# Patient Record
Sex: FEMALE | Race: WHITE | NOT HISPANIC OR LATINO | Employment: FULL TIME | URBAN - METROPOLITAN AREA
[De-identification: names, ages, dates, MRNs, and addresses within clinical notes are randomized per-mention and may not be internally consistent; named-entity substitution may affect disease eponyms.]

---

## 2023-02-10 PROBLEM — E88.810 ABDOMINAL OBESITY AND METABOLIC SYNDROME: Status: ACTIVE | Noted: 2023-02-10

## 2023-02-10 PROBLEM — G89.29 CHRONIC PAIN OF RIGHT KNEE: Status: ACTIVE | Noted: 2023-02-10

## 2023-02-10 PROBLEM — R63.2 POLYPHAGIA: Status: ACTIVE | Noted: 2023-02-10

## 2023-02-10 PROBLEM — F41.9 ANXIETY AND DEPRESSION: Status: ACTIVE | Noted: 2023-02-10

## 2023-02-10 PROBLEM — R63.5 WEIGHT GAIN FOLLOWING GASTRIC BYPASS SURGERY: Status: ACTIVE | Noted: 2023-02-10

## 2023-02-10 PROBLEM — J45.909 ASTHMA (HHS-HCC): Status: ACTIVE | Noted: 2023-02-10

## 2023-02-10 PROBLEM — E53.8 VITAMIN B 12 DEFICIENCY: Status: ACTIVE | Noted: 2023-02-10

## 2023-02-10 PROBLEM — R87.619 ABNORMAL PAP SMEAR OF CERVIX: Status: ACTIVE | Noted: 2023-02-10

## 2023-02-10 PROBLEM — E55.9 VITAMIN D DEFICIENCY: Status: ACTIVE | Noted: 2023-02-10

## 2023-02-10 PROBLEM — E58 CALCIUM DEFICIENCY: Status: ACTIVE | Noted: 2023-02-10

## 2023-02-10 PROBLEM — Z90.3 HISTORY OF SLEEVE GASTRECTOMY: Status: ACTIVE | Noted: 2023-02-10

## 2023-02-10 PROBLEM — H93.11 TINNITUS OF RIGHT EAR: Status: ACTIVE | Noted: 2023-02-10

## 2023-02-10 PROBLEM — K21.9 GERD (GASTROESOPHAGEAL REFLUX DISEASE): Status: ACTIVE | Noted: 2023-02-10

## 2023-02-10 PROBLEM — K58.9: Status: ACTIVE | Noted: 2023-02-10

## 2023-02-10 PROBLEM — E51.9 VITAMIN B1 DEFICIENCY: Status: ACTIVE | Noted: 2023-02-10

## 2023-02-10 PROBLEM — K91.2 POSTOPERATIVE MALABSORPTION (HHS-HCC): Status: ACTIVE | Noted: 2023-02-10

## 2023-02-10 PROBLEM — E50.9 VITAMIN A DEFICIENCY: Status: ACTIVE | Noted: 2023-02-10

## 2023-02-10 PROBLEM — M17.11 PRIMARY OSTEOARTHRITIS OF RIGHT KNEE: Status: ACTIVE | Noted: 2023-02-10

## 2023-02-10 PROBLEM — D64.9 ANEMIA: Status: ACTIVE | Noted: 2023-02-10

## 2023-02-10 PROBLEM — D63.1 ANEMIA DUE TO CHRONIC KIDNEY DISEASE: Status: ACTIVE | Noted: 2023-02-10

## 2023-02-10 PROBLEM — R87.610 PAP SMEAR ABNORMALITY OF CERVIX WITH ASCUS FAVORING BENIGN: Status: ACTIVE | Noted: 2023-02-10

## 2023-02-10 PROBLEM — E61.1 IRON DEFICIENCY: Status: ACTIVE | Noted: 2023-02-10

## 2023-02-10 PROBLEM — O99.619: Status: ACTIVE | Noted: 2023-02-10

## 2023-02-10 PROBLEM — Z98.84 WEIGHT GAIN FOLLOWING GASTRIC BYPASS SURGERY: Status: ACTIVE | Noted: 2023-02-10

## 2023-02-10 PROBLEM — E66.01 PSYCHOLOGICAL FACTORS AFFECTING MORBID OBESITY (MULTI): Status: ACTIVE | Noted: 2023-02-10

## 2023-02-10 PROBLEM — K44.9 HIATAL HERNIA: Status: ACTIVE | Noted: 2023-02-10

## 2023-02-10 PROBLEM — H90.41 SENSORINEURAL HEARING LOSS (SNHL) OF RIGHT EAR WITH UNRESTRICTED HEARING OF LEFT EAR: Status: ACTIVE | Noted: 2023-02-10

## 2023-02-10 PROBLEM — F54 PSYCHOLOGICAL FACTORS AFFECTING MORBID OBESITY (MULTI): Status: ACTIVE | Noted: 2023-02-10

## 2023-02-10 PROBLEM — G47.30 SLEEP APNEA: Status: ACTIVE | Noted: 2023-02-10

## 2023-02-10 PROBLEM — H91.90 HEARING LOSS: Status: ACTIVE | Noted: 2023-02-10

## 2023-02-10 PROBLEM — N18.9 ANEMIA DUE TO CHRONIC KIDNEY DISEASE: Status: ACTIVE | Noted: 2023-02-10

## 2023-02-10 PROBLEM — M25.561 CHRONIC PAIN OF RIGHT KNEE: Status: ACTIVE | Noted: 2023-02-10

## 2023-02-10 PROBLEM — F32.A ANXIETY AND DEPRESSION: Status: ACTIVE | Noted: 2023-02-10

## 2023-02-10 PROBLEM — Z98.84 BARIATRIC SURGERY STATUS: Status: ACTIVE | Noted: 2023-02-10

## 2023-02-10 PROBLEM — E66.9 ABDOMINAL OBESITY AND METABOLIC SYNDROME: Status: ACTIVE | Noted: 2023-02-10

## 2023-02-10 RX ORDER — TOPIRAMATE 25 MG/1
TABLET ORAL
COMMUNITY
Start: 2022-09-07 | End: 2023-09-26 | Stop reason: ALTCHOICE

## 2023-02-10 RX ORDER — ALBUTEROL SULFATE 0.83 MG/ML
SOLUTION RESPIRATORY (INHALATION)
COMMUNITY

## 2023-02-10 RX ORDER — OMEPRAZOLE 40 MG/1
1 CAPSULE, DELAYED RELEASE ORAL DAILY
COMMUNITY
Start: 2022-03-11 | End: 2023-09-26 | Stop reason: SDUPTHER

## 2023-02-10 RX ORDER — NABUMETONE 500 MG/1
1 TABLET, FILM COATED ORAL 2 TIMES DAILY
COMMUNITY
Start: 2022-04-13 | End: 2023-09-26 | Stop reason: ALTCHOICE

## 2023-02-10 RX ORDER — SERTRALINE HYDROCHLORIDE 100 MG/1
1 TABLET, FILM COATED ORAL DAILY
COMMUNITY
Start: 2020-04-20 | End: 2023-04-03 | Stop reason: SDUPTHER

## 2023-02-10 RX ORDER — ALBUTEROL SULFATE 90 UG/1
2 AEROSOL, METERED RESPIRATORY (INHALATION) EVERY 6 HOURS PRN
COMMUNITY
Start: 2020-12-14

## 2023-02-10 RX ORDER — ELECTROLYTES/DEXTROSE
1 SOLUTION, ORAL ORAL 2 TIMES DAILY
COMMUNITY
Start: 2021-03-23

## 2023-02-10 RX ORDER — METFORMIN HYDROCHLORIDE 500 MG/1
2 TABLET, EXTENDED RELEASE ORAL 2 TIMES DAILY
COMMUNITY
Start: 2022-06-15 | End: 2023-09-26 | Stop reason: ALTCHOICE

## 2023-02-10 RX ORDER — UBIDECARENONE 75 MG
1 CAPSULE ORAL DAILY
COMMUNITY
Start: 2019-02-11 | End: 2023-12-29

## 2023-03-06 ENCOUNTER — CLINICAL SUPPORT (OUTPATIENT)
Dept: PRIMARY CARE | Facility: CLINIC | Age: 38
End: 2023-03-06
Payer: COMMERCIAL

## 2023-03-06 DIAGNOSIS — E53.8 VITAMIN B 12 DEFICIENCY: Primary | ICD-10-CM

## 2023-03-06 PROCEDURE — 96372 THER/PROPH/DIAG INJ SC/IM: CPT | Performed by: FAMILY MEDICINE

## 2023-03-06 RX ORDER — CYANOCOBALAMIN 1000 UG/ML
1000 INJECTION, SOLUTION INTRAMUSCULAR; SUBCUTANEOUS ONCE
Status: COMPLETED | OUTPATIENT
Start: 2023-03-06 | End: 2023-03-06

## 2023-03-06 RX ADMIN — CYANOCOBALAMIN 1000 MCG: 1000 INJECTION, SOLUTION INTRAMUSCULAR; SUBCUTANEOUS at 13:09

## 2023-03-06 NOTE — PROGRESS NOTES
Patient is here today for a B12 injection which is a standing order. Administered in right arm. Patient tolerated well.

## 2023-04-03 ENCOUNTER — TELEPHONE (OUTPATIENT)
Dept: PRIMARY CARE | Facility: CLINIC | Age: 38
End: 2023-04-03
Payer: COMMERCIAL

## 2023-04-03 DIAGNOSIS — F41.9 ANXIETY AND DEPRESSION: ICD-10-CM

## 2023-04-03 DIAGNOSIS — F32.A ANXIETY AND DEPRESSION: ICD-10-CM

## 2023-04-03 RX ORDER — SERTRALINE HYDROCHLORIDE 100 MG/1
100 TABLET, FILM COATED ORAL DAILY
Qty: 30 TABLET | Refills: 0 | Status: SHIPPED | OUTPATIENT
Start: 2023-04-03 | End: 2023-07-12 | Stop reason: SDUPTHER

## 2023-07-06 LAB
ACTIVATED PARTIAL THROMBOPLASTIN TIME IN PPP BY COAGULATION ASSAY: 32 SEC (ref 27–38)
ALANINE AMINOTRANSFERASE (SGPT) (U/L) IN SER/PLAS: 11 U/L (ref 7–45)
ALBUMIN (G/DL) IN SER/PLAS: 4 G/DL (ref 3.4–5)
ALKALINE PHOSPHATASE (U/L) IN SER/PLAS: 66 U/L (ref 33–110)
AMPHETAMINE (PRESENCE) IN URINE BY SCREEN METHOD: NORMAL
ANION GAP IN SER/PLAS: 14 MMOL/L (ref 10–20)
ASPARTATE AMINOTRANSFERASE (SGOT) (U/L) IN SER/PLAS: 13 U/L (ref 9–39)
BARBITURATES PRESENCE IN URINE BY SCREEN METHOD: NORMAL
BASOPHILS (10*3/UL) IN BLOOD BY AUTOMATED COUNT: 0.02 X10E9/L (ref 0–0.1)
BASOPHILS/100 LEUKOCYTES IN BLOOD BY AUTOMATED COUNT: 0.3 % (ref 0–2)
BENZODIAZEPINE (PRESENCE) IN URINE BY SCREEN METHOD: NORMAL
BILIRUBIN TOTAL (MG/DL) IN SER/PLAS: 0.4 MG/DL (ref 0–1.2)
C PEPTIDE (NG/ML) IN SER/PLAS: 5.1 NG/ML (ref 0.7–3.9)
CALCIDIOL (25 OH VITAMIN D3) (NG/ML) IN SER/PLAS: 17 NG/ML
CALCIUM (MG/DL) IN SER/PLAS: 8.6 MG/DL (ref 8.6–10.3)
CANNABINOIDS IN URINE BY SCREEN METHOD: NORMAL
CARBON DIOXIDE, TOTAL (MMOL/L) IN SER/PLAS: 23 MMOL/L (ref 21–32)
CHLORIDE (MMOL/L) IN SER/PLAS: 106 MMOL/L (ref 98–107)
CHOLESTEROL (MG/DL) IN SER/PLAS: 165 MG/DL (ref 0–199)
CHOLESTEROL IN HDL (MG/DL) IN SER/PLAS: 35.5 MG/DL
CHOLESTEROL/HDL RATIO: 4.6
COBALAMIN (VITAMIN B12) (PG/ML) IN SER/PLAS: 355 PG/ML (ref 211–911)
COCAINE (PRESENCE) IN URINE BY SCREEN METHOD: NORMAL
CREATININE (MG/DL) IN SER/PLAS: 0.66 MG/DL (ref 0.5–1.05)
DRUG SCREEN COMMENT URINE: NORMAL
EOSINOPHILS (10*3/UL) IN BLOOD BY AUTOMATED COUNT: 0.05 X10E9/L (ref 0–0.7)
EOSINOPHILS/100 LEUKOCYTES IN BLOOD BY AUTOMATED COUNT: 0.8 % (ref 0–6)
ERYTHROCYTE DISTRIBUTION WIDTH (RATIO) BY AUTOMATED COUNT: 14.1 % (ref 11.5–14.5)
ERYTHROCYTE MEAN CORPUSCULAR HEMOGLOBIN CONCENTRATION (G/DL) BY AUTOMATED: 31 G/DL (ref 32–36)
ERYTHROCYTE MEAN CORPUSCULAR VOLUME (FL) BY AUTOMATED COUNT: 85 FL (ref 80–100)
ERYTHROCYTES (10*6/UL) IN BLOOD BY AUTOMATED COUNT: 4.17 X10E12/L (ref 4–5.2)
ESTIMATED AVERAGE GLUCOSE FOR HBA1C: 108 MG/DL
FENTANYL URINE: NORMAL
FERRITIN (UG/LL) IN SER/PLAS: 19 UG/L (ref 8–150)
FOLATE (NG/ML) IN SER/PLAS: 7 NG/ML
GFR FEMALE: >90 ML/MIN/1.73M2
GLUCOSE (MG/DL) IN SER/PLAS: 81 MG/DL (ref 74–99)
H. PYLORI UBIT: NEGATIVE
HEMATOCRIT (%) IN BLOOD BY AUTOMATED COUNT: 35.5 % (ref 36–46)
HEMOGLOBIN (G/DL) IN BLOOD: 11 G/DL (ref 12–16)
HEMOGLOBIN A1C/HEMOGLOBIN TOTAL IN BLOOD: 5.4 %
IMMATURE GRANULOCYTES/100 LEUKOCYTES IN BLOOD BY AUTOMATED COUNT: 0.2 % (ref 0–0.9)
INR IN PPP BY COAGULATION ASSAY: 1.1 (ref 0.9–1.1)
IRON (UG/DL) IN SER/PLAS: 64 UG/DL (ref 35–150)
IRON BINDING CAPACITY (UG/DL) IN SER/PLAS: 393 UG/DL (ref 240–445)
IRON SATURATION (%) IN SER/PLAS: 16 % (ref 25–45)
LDL: 113 MG/DL (ref 0–99)
LEUKOCYTES (10*3/UL) IN BLOOD BY AUTOMATED COUNT: 6.3 X10E9/L (ref 4.4–11.3)
LYMPHOCYTES (10*3/UL) IN BLOOD BY AUTOMATED COUNT: 2.02 X10E9/L (ref 1.2–4.8)
LYMPHOCYTES/100 LEUKOCYTES IN BLOOD BY AUTOMATED COUNT: 32 % (ref 13–44)
METHADONE (PRESENCE) IN URINE BY SCREEN METHOD: NORMAL
MONOCYTES (10*3/UL) IN BLOOD BY AUTOMATED COUNT: 0.65 X10E9/L (ref 0.1–1)
MONOCYTES/100 LEUKOCYTES IN BLOOD BY AUTOMATED COUNT: 10.3 % (ref 2–10)
NEUTROPHILS (10*3/UL) IN BLOOD BY AUTOMATED COUNT: 3.56 X10E9/L (ref 1.2–7.7)
NEUTROPHILS/100 LEUKOCYTES IN BLOOD BY AUTOMATED COUNT: 56.4 % (ref 40–80)
OPIATES (PRESENCE) IN URINE BY SCREEN METHOD: NORMAL
OXYCODONE (PRESENCE) IN URINE BY SCREEN METHOD: NORMAL
PARATHYRIN INTACT (PG/ML) IN SER/PLAS: 34.9 PG/ML (ref 18.5–88)
PHENCYCLIDINE (PRESENCE) IN URINE BY SCREEN METHOD: NORMAL
PLATELETS (10*3/UL) IN BLOOD AUTOMATED COUNT: 311 X10E9/L (ref 150–450)
POTASSIUM (MMOL/L) IN SER/PLAS: 3.5 MMOL/L (ref 3.5–5.3)
PROTEIN TOTAL: 7.3 G/DL (ref 6.4–8.2)
PROTHROMBIN TIME (PT) IN PPP BY COAGULATION ASSAY: 12.7 SEC (ref 9.8–12.8)
SODIUM (MMOL/L) IN SER/PLAS: 139 MMOL/L (ref 136–145)
THYROTROPIN (MIU/L) IN SER/PLAS BY DETECTION LIMIT <= 0.05 MIU/L: 3.59 MIU/L (ref 0.44–3.98)
TRIGLYCERIDE (MG/DL) IN SER/PLAS: 82 MG/DL (ref 0–149)
UREA NITROGEN (MG/DL) IN SER/PLAS: 13 MG/DL (ref 6–23)
VLDL: 16 MG/DL (ref 0–40)

## 2023-07-10 LAB
COPPER: 157.6 UG/DL (ref 80–155)
ZINC,SERUM OR PLASMA: 85.6 UG/DL (ref 60–120)

## 2023-07-11 LAB
COTININE BLOOD QUANTITATIVE: <5 NG/ML
NICOTINE BLOOD QUANTITATIVE: <5 NG/ML
VITAMIN B1, WHOLE BLOOD: 111 NMOL/L (ref 70–180)

## 2023-07-12 ENCOUNTER — TELEPHONE (OUTPATIENT)
Dept: PRIMARY CARE | Facility: CLINIC | Age: 38
End: 2023-07-12
Payer: COMMERCIAL

## 2023-07-12 DIAGNOSIS — F32.A ANXIETY AND DEPRESSION: ICD-10-CM

## 2023-07-12 DIAGNOSIS — F41.9 ANXIETY AND DEPRESSION: ICD-10-CM

## 2023-07-13 RX ORDER — SERTRALINE HYDROCHLORIDE 100 MG/1
100 TABLET, FILM COATED ORAL DAILY
Qty: 90 TABLET | Refills: 0 | Status: SHIPPED | OUTPATIENT
Start: 2023-07-13 | End: 2023-09-26 | Stop reason: SDUPTHER

## 2023-09-26 ENCOUNTER — OFFICE VISIT (OUTPATIENT)
Dept: PRIMARY CARE | Facility: CLINIC | Age: 38
End: 2023-09-26
Payer: COMMERCIAL

## 2023-09-26 VITALS
BODY MASS INDEX: 45 KG/M2 | SYSTOLIC BLOOD PRESSURE: 110 MMHG | HEART RATE: 70 BPM | DIASTOLIC BLOOD PRESSURE: 62 MMHG | TEMPERATURE: 97.5 F | HEIGHT: 63 IN | RESPIRATION RATE: 16 BRPM | WEIGHT: 254 LBS

## 2023-09-26 DIAGNOSIS — F32.A ANXIETY AND DEPRESSION: ICD-10-CM

## 2023-09-26 DIAGNOSIS — F41.9 ANXIETY AND DEPRESSION: ICD-10-CM

## 2023-09-26 DIAGNOSIS — R91.1 RIGHT LOWER LOBE PULMONARY NODULE: ICD-10-CM

## 2023-09-26 DIAGNOSIS — Z01.810 PREOP CARDIOVASCULAR EXAM: Primary | ICD-10-CM

## 2023-09-26 DIAGNOSIS — E53.8 VITAMIN B 12 DEFICIENCY: ICD-10-CM

## 2023-09-26 DIAGNOSIS — E61.1 IRON DEFICIENCY: ICD-10-CM

## 2023-09-26 DIAGNOSIS — D63.1 ANEMIA DUE TO CHRONIC KIDNEY DISEASE, UNSPECIFIED CKD STAGE: ICD-10-CM

## 2023-09-26 DIAGNOSIS — N18.9 ANEMIA DUE TO CHRONIC KIDNEY DISEASE, UNSPECIFIED CKD STAGE: ICD-10-CM

## 2023-09-26 DIAGNOSIS — Z23 NEED FOR VACCINATION: ICD-10-CM

## 2023-09-26 DIAGNOSIS — Z90.3 HISTORY OF SLEEVE GASTRECTOMY: ICD-10-CM

## 2023-09-26 DIAGNOSIS — K21.9 GASTROESOPHAGEAL REFLUX DISEASE, UNSPECIFIED WHETHER ESOPHAGITIS PRESENT: ICD-10-CM

## 2023-09-26 PROCEDURE — 1036F TOBACCO NON-USER: CPT | Performed by: FAMILY MEDICINE

## 2023-09-26 PROCEDURE — 90471 IMMUNIZATION ADMIN: CPT | Performed by: FAMILY MEDICINE

## 2023-09-26 PROCEDURE — 99214 OFFICE O/P EST MOD 30 MIN: CPT | Performed by: FAMILY MEDICINE

## 2023-09-26 PROCEDURE — 90686 IIV4 VACC NO PRSV 0.5 ML IM: CPT | Performed by: FAMILY MEDICINE

## 2023-09-26 PROCEDURE — 96372 THER/PROPH/DIAG INJ SC/IM: CPT | Performed by: FAMILY MEDICINE

## 2023-09-26 RX ORDER — OMEPRAZOLE 40 MG/1
40 CAPSULE, DELAYED RELEASE ORAL DAILY
Qty: 90 CAPSULE | Refills: 1 | Status: SHIPPED | OUTPATIENT
Start: 2023-09-26 | End: 2023-12-29 | Stop reason: WASHOUT

## 2023-09-26 RX ORDER — CYANOCOBALAMIN 1000 UG/ML
1000 INJECTION, SOLUTION INTRAMUSCULAR; SUBCUTANEOUS ONCE
Status: COMPLETED | OUTPATIENT
Start: 2023-09-26 | End: 2023-09-26

## 2023-09-26 RX ORDER — SERTRALINE HYDROCHLORIDE 100 MG/1
100 TABLET, FILM COATED ORAL DAILY
Qty: 90 TABLET | Refills: 0 | Status: SHIPPED | OUTPATIENT
Start: 2023-09-26 | End: 2024-01-23 | Stop reason: SDUPTHER

## 2023-09-26 RX ORDER — METFORMIN HYDROCHLORIDE 500 MG/1
500 TABLET ORAL
Qty: 90 TABLET | Refills: 3 | Status: SHIPPED | OUTPATIENT
Start: 2023-09-26 | End: 2024-04-16 | Stop reason: ALTCHOICE

## 2023-09-26 RX ADMIN — CYANOCOBALAMIN 1000 MCG: 1000 INJECTION, SOLUTION INTRAMUSCULAR; SUBCUTANEOUS at 11:31
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
## 2023-09-30 ENCOUNTER — LAB (OUTPATIENT)
Dept: LAB | Facility: LAB | Age: 38
End: 2023-09-30
Payer: COMMERCIAL

## 2023-09-30 ENCOUNTER — ANCILLARY PROCEDURE (OUTPATIENT)
Dept: RADIOLOGY | Facility: CLINIC | Age: 38
End: 2023-09-30
Payer: COMMERCIAL

## 2023-09-30 DIAGNOSIS — R91.1 RIGHT LOWER LOBE PULMONARY NODULE: ICD-10-CM

## 2023-09-30 DIAGNOSIS — E61.1 IRON DEFICIENCY: ICD-10-CM

## 2023-09-30 DIAGNOSIS — K21.9 GASTROESOPHAGEAL REFLUX DISEASE, UNSPECIFIED WHETHER ESOPHAGITIS PRESENT: ICD-10-CM

## 2023-09-30 DIAGNOSIS — F41.9 ANXIETY AND DEPRESSION: ICD-10-CM

## 2023-09-30 DIAGNOSIS — Z90.3 HISTORY OF SLEEVE GASTRECTOMY: ICD-10-CM

## 2023-09-30 DIAGNOSIS — E53.8 VITAMIN B 12 DEFICIENCY: ICD-10-CM

## 2023-09-30 DIAGNOSIS — D63.1 ANEMIA DUE TO CHRONIC KIDNEY DISEASE, UNSPECIFIED CKD STAGE: ICD-10-CM

## 2023-09-30 DIAGNOSIS — N18.9 ANEMIA DUE TO CHRONIC KIDNEY DISEASE, UNSPECIFIED CKD STAGE: ICD-10-CM

## 2023-09-30 DIAGNOSIS — F32.A ANXIETY AND DEPRESSION: ICD-10-CM

## 2023-09-30 LAB
ALBUMIN SERPL BCP-MCNC: 3.4 G/DL (ref 3.4–5)
ALP SERPL-CCNC: 51 U/L (ref 33–110)
ALT SERPL W P-5'-P-CCNC: 10 U/L (ref 7–45)
ANION GAP SERPL CALC-SCNC: 10 MMOL/L (ref 10–20)
AST SERPL W P-5'-P-CCNC: 13 U/L (ref 9–39)
BASOPHILS # BLD AUTO: 0.05 X10*3/UL (ref 0–0.1)
BASOPHILS NFR BLD AUTO: 0.7 %
BILIRUB SERPL-MCNC: 0.4 MG/DL (ref 0–1.2)
BUN SERPL-MCNC: 7 MG/DL (ref 6–23)
CALCIUM SERPL-MCNC: 8.2 MG/DL (ref 8.6–10.3)
CHLORIDE SERPL-SCNC: 107 MMOL/L (ref 98–107)
CO2 SERPL-SCNC: 27 MMOL/L (ref 21–32)
CREAT SERPL-MCNC: 0.63 MG/DL (ref 0.5–1.05)
EOSINOPHIL # BLD AUTO: 0.04 X10*3/UL (ref 0–0.7)
EOSINOPHIL NFR BLD AUTO: 0.6 %
ERYTHROCYTE [DISTWIDTH] IN BLOOD BY AUTOMATED COUNT: 14.7 % (ref 11.5–14.5)
EST. AVERAGE GLUCOSE BLD GHB EST-MCNC: 105 MG/DL
GFR SERPL CREATININE-BSD FRML MDRD: >90 ML/MIN/1.73M*2
GLUCOSE SERPL-MCNC: 85 MG/DL (ref 74–99)
HBA1C MFR BLD: 5.3 %
HCT VFR BLD AUTO: 35.6 % (ref 36–46)
HGB BLD-MCNC: 10.9 G/DL (ref 12–16)
IMM GRANULOCYTES # BLD AUTO: 0.02 X10*3/UL (ref 0–0.7)
IMM GRANULOCYTES NFR BLD AUTO: 0.3 % (ref 0–0.9)
LYMPHOCYTES # BLD AUTO: 1.95 X10*3/UL (ref 1.2–4.8)
LYMPHOCYTES NFR BLD AUTO: 28.1 %
MCH RBC QN AUTO: 26 PG (ref 26–34)
MCHC RBC AUTO-ENTMCNC: 30.6 G/DL (ref 32–36)
MCV RBC AUTO: 85 FL (ref 80–100)
MONOCYTES # BLD AUTO: 0.64 X10*3/UL (ref 0.1–1)
MONOCYTES NFR BLD AUTO: 9.2 %
NEUTROPHILS # BLD AUTO: 4.25 X10*3/UL (ref 1.2–7.7)
NEUTROPHILS NFR BLD AUTO: 61.1 %
NRBC BLD-RTO: 0.7 /100 WBCS (ref 0–0)
PLATELET # BLD AUTO: 273 X10*3/UL (ref 150–450)
PMV BLD AUTO: 10.9 FL (ref 7.5–11.5)
POTASSIUM SERPL-SCNC: 3.9 MMOL/L (ref 3.5–5.3)
PROT SERPL-MCNC: 5.9 G/DL (ref 6.4–8.2)
RBC # BLD AUTO: 4.19 X10*6/UL (ref 4–5.2)
SODIUM SERPL-SCNC: 140 MMOL/L (ref 136–145)
WBC # BLD AUTO: 7 X10*3/UL (ref 4.4–11.3)

## 2023-09-30 PROCEDURE — 71250 CT THORAX DX C-: CPT | Performed by: RADIOLOGY

## 2023-09-30 PROCEDURE — 80053 COMPREHEN METABOLIC PANEL: CPT

## 2023-09-30 PROCEDURE — 36415 COLL VENOUS BLD VENIPUNCTURE: CPT

## 2023-09-30 PROCEDURE — 85025 COMPLETE CBC W/AUTO DIFF WBC: CPT

## 2023-09-30 PROCEDURE — 71250 CT THORAX DX C-: CPT

## 2023-10-03 DIAGNOSIS — D63.1 ANEMIA DUE TO CHRONIC KIDNEY DISEASE, UNSPECIFIED CKD STAGE: ICD-10-CM

## 2023-10-03 DIAGNOSIS — R94.6 ABNORMAL THYROID EXAM: Primary | ICD-10-CM

## 2023-10-03 DIAGNOSIS — N18.9 ANEMIA DUE TO CHRONIC KIDNEY DISEASE, UNSPECIFIED CKD STAGE: ICD-10-CM

## 2023-10-03 DIAGNOSIS — K22.9 ABNORMALITY OF ESOPHAGUS: ICD-10-CM

## 2023-10-07 ENCOUNTER — ANCILLARY PROCEDURE (OUTPATIENT)
Dept: RADIOLOGY | Facility: CLINIC | Age: 38
End: 2023-10-07
Payer: COMMERCIAL

## 2023-10-07 DIAGNOSIS — R94.6 ABNORMAL THYROID EXAM: ICD-10-CM

## 2023-10-07 PROCEDURE — 76536 US EXAM OF HEAD AND NECK: CPT

## 2023-10-07 PROCEDURE — 76536 US EXAM OF HEAD AND NECK: CPT | Performed by: RADIOLOGY

## 2023-10-25 ENCOUNTER — TELEPHONE (OUTPATIENT)
Dept: SURGERY | Facility: CLINIC | Age: 38
End: 2023-10-25
Payer: COMMERCIAL

## 2023-10-25 ENCOUNTER — DOCUMENTATION (OUTPATIENT)
Dept: SURGERY | Facility: CLINIC | Age: 38
End: 2023-10-25
Payer: COMMERCIAL

## 2023-10-25 NOTE — TELEPHONE ENCOUNTER
Ivan Pace,     Just reaching out to you to see if you are still interested in the bariatric surgery program?  If you are, you will need to be scheduled for an initial consult with both Dr. Gonsalez and your dietitian.     Hyacinth will be reaching out to you to get these scheduled. She is CC'd on this email for your convenience.     If you are no longer interested in pursuing surgery, please let us know.     Thanks!!   Minerva Allen, PRAVINN, RN   Assistant Nurse Manager   Bariatric and Metabolic Surgery   [address] [place] [address]   P: [phone] F: [phone]  Available via Epic Secure Chat

## 2023-12-04 ENCOUNTER — OFFICE VISIT (OUTPATIENT)
Dept: ORTHOPEDIC SURGERY | Facility: CLINIC | Age: 38
End: 2023-12-04
Payer: COMMERCIAL

## 2023-12-04 DIAGNOSIS — G56.02 CARPAL TUNNEL SYNDROME OF LEFT WRIST: ICD-10-CM

## 2023-12-04 PROCEDURE — 1036F TOBACCO NON-USER: CPT | Performed by: INTERNAL MEDICINE

## 2023-12-04 PROCEDURE — L3908 WHO COCK-UP NONMOLDE PRE OTS: HCPCS | Performed by: INTERNAL MEDICINE

## 2023-12-04 PROCEDURE — 99213 OFFICE O/P EST LOW 20 MIN: CPT | Performed by: INTERNAL MEDICINE

## 2023-12-04 RX ORDER — METHYLPREDNISOLONE 4 MG/1
TABLET ORAL
Qty: 1 EACH | Refills: 0 | Status: SHIPPED | OUTPATIENT
Start: 2023-12-04 | End: 2023-12-29 | Stop reason: WASHOUT

## 2023-12-04 NOTE — PROGRESS NOTES
Acute Injury New Patient Visit    CC:   Chief Complaint   Patient presents with    Left Wrist - Numbness, Pain       HPI: Katelynn is a 38 y.o. female presents today with tingling numbness of the left hand.  Is becoming more constant.  There is no fall or traumatic injury.  Numbness gets worse throughout the night.  She is here for initial evaluation.  No treatments been done.        Review of Systems   GENERAL: Negative for malaise, significant weight loss, fever  MUSCULOSKELETAL: See HPI  NEURO:  Negative for numbness / tingling     Past Medical History  Past Medical History:   Diagnosis Date    Pap test, as part of routine gynecological examination 08/2021    LGSIL, hpv negative-gyn    Recurrent pregnancy loss        Medication review  Medication Documentation Review Audit       Reviewed by Malini Foster MD (Physician) on 09/26/23 at 1125      Medication Order Taking? Sig Documenting Provider Last Dose Status   albuterol 2.5 mg /3 mL (0.083 %) nebulizer solution [id number] Yes 1 UNIT DOSE AS NEEDED - RARELY Historical Provider, MD Taking Active   albuterol 90 mcg/actuation inhaler [id number] Yes Inhale 2 puffs every 6 hours if needed. Historical Provider, MD Taking Active   Ca-D3-mag ox-zinc--rosario-bor (Calcium 600-D3 Plus, mag-zinc,) 600 mg calcium- 20 mcg-50 mg tablet [id number] Yes Take 1 tablet by mouth in the morning and at bedtime. Historical Provider, MD Taking Active   cyanocobalamin (Vitamin B-12) 500 mcg tablet [id number] Yes Take 1 tablet (500 mcg) by mouth once daily. Historical Provider, MD Taking Active   cyanocobalamin (Vitamin B-12) injection 1,000 mcg [id number]   Malini Foster MD  Active   Lactobac. rhamnosus GG-inulin 20 billion cell -200 mg capsule [id number] Yes Take 1 tablet by mouth in the morning. Historical Provider, MD Taking Active   metFORMIN (Glucophage) 500 mg tablet [id number]  Take 1 tablet (500 mg) by mouth once daily with a meal. Malini Foster MD  Active     Discontinued  09/26/23 1100     Discontinued 09/26/23 1101   Discontinued 09/26/23 1117   omeprazole (PriLOSEC) 40 mg DR capsule [id number]  Take 1 capsule (40 mg) by mouth once daily. Open capsule, sprinkle in SF applesauce or pudding, swallow. DO NOT CHEW Malini Foster MD  Active   Discontinued 09/26/23 1120   sertraline (Zoloft) 100 mg tablet [id number]  Take 1 tablet (100 mg) by mouth once daily. Malini Foster MD  Active     Discontinued 09/26/23 1101                    Allergies  Allergies   Allergen Reactions    Aspirin Unknown    Fish Containing Products Unknown    Methylprednisolone Unknown       Social History  Social History     Socioeconomic History    Marital status:      Spouse name: Not on file    Number of children: Not on file    Years of education: Not on file    Highest education level: Not on file   Occupational History    Not on file   Tobacco Use    Smoking status: Former     Types: Cigarettes    Smokeless tobacco: Never   Substance and Sexual Activity    Alcohol use: Yes     Comment: social    Drug use: Never    Sexual activity: Not on file   Other Topics Concern    Not on file   Social History Narrative    Not on file     Social Determinants of Health     Financial Resource Strain: Not on file   Food Insecurity: Not on file   Transportation Needs: Not on file   Physical Activity: Not on file   Stress: Not on file   Social Connections: Not on file   Intimate Partner Violence: Not on file   Housing Stability: Not on file       Surgical History  Past Surgical History:   Procedure Laterality Date    CERVICAL BIOPSY  10/2021    DILATION AND CURETTAGE OF UTERUS  2015    x3-2003/2008/2010/2015    SLEEVE GASTROPLASTY  08/2018    TUBAL LIGATION  2020       Physical Exam:  GENERAL:  Patient is awake, alert, and oriented to person place and time.  Patient appears well nourished and well kept.  Affect Calm, Not Acutely Distressed.  HEENT:  Normocephalic, Atraumatic, EOMI  CARDIOVASCULAR:   Hemodynamically stable.  RESPIRATORY:  Normal respirations with unlabored breathing.  Extremity: Left hand shows skin is intact.  No obvious swelling or deformity.  There is no pain over distal radius or distal end.  There is no pain of the scaphoid bone.  No pain over the metacarpals.  Positive Phalen's test and Tinel sign of the left wrist.  No thenar atrophy.  Satisfactory capillary refill time.  Right hand and wrist with exam for comparison.      Diagnostics: None today      Procedure: None    Assessment: Left carpal tunnel    Plan: Katelynn presents today with left hand pain with a history and physical examination because of the left carpal tunnel.  Recommend a wrist pro brace at bedtime, Medrol Dosepak.  We will request for upper extremity EMG to evaluate for carpal tunnel.  She will follow-up with Dr. Cheyanne Castillo after the EMG.    No orders of the defined types were placed in this encounter.     At the conclusion of the visit there were no further questions by the patient/family regarding their plan of care.  Patient was instructed to call or return with any issues, questions, or concerns regarding their injury and/or treatment plan described above.     12/04/23 at 1:28 PM - Nicole Robledo MD    Office: [phone]    This note was prepared using voice recognition software.  The details of this note are correct and have been reviewed, and corrected to the best of my ability.  Some grammatical errors may persist related to the Dragon software.

## 2023-12-08 ENCOUNTER — TELEPHONE (OUTPATIENT)
Dept: GASTROENTEROLOGY | Facility: CLINIC | Age: 38
End: 2023-12-08
Payer: COMMERCIAL

## 2023-12-08 NOTE — PROGRESS NOTES
Gastroenterology Office Visit     History of Present Illness:   Katelynn Avery is a 38 y.o. female who presents to GI clinic for acid reflux.    She has had issues with acid reflux for years. She restarted the omeprazole in September once a day approximately 30 minutes before breakfast. She feels like a dragon, it is burning in nature and will wake up with an acidic taste in her mouth. This occurs twice a week.  She denies any dysphagia, odynophagia, or abdominal pain. She endorses a lot of abdominal bloating and gas pain.She endorses some nausea which could be some the sleeve. She endorses vomiting once a week but seems to depend on what she eats. She moves her bowels every other day and day of normal caliber and color. She denies any diarrhea, melena, or hematochezia. She endorses some mild constipation.     She is taking a probiotic     Last colonoscopy: NA  Last endosopy: 6/29/2022    Abdominal surgeries include sleeve gastrectomy. Dr Gonsalez is interested in doing a revision of her gastric sleeve. She is losing weight intentionally and has lost 40 lbs in the last year. She has had a tubal ligation      Review of Systems  Review of Systems   Constitutional:  Negative for appetite change, chills, fever and unexpected weight change.   HENT:  Positive for sore throat. Negative for mouth sores and trouble swallowing.    Eyes:  Positive for pain. Negative for visual disturbance.   Respiratory:  Positive for cough. Negative for shortness of breath and wheezing.    Cardiovascular:  Negative for chest pain.   Genitourinary:  Negative for decreased urine volume, dysuria and hematuria.   Musculoskeletal:  Negative for arthralgias, joint swelling and myalgias.   Skin:  Negative for pallor and rash.   Neurological:  Positive for headaches. Negative for dizziness, weakness and numbness.   Psychiatric/Behavioral:  Negative for confusion.        Past Medical History   has a past medical history of Pap test, as part of routine  gynecological examination (08/2021) and Recurrent pregnancy loss.     Past Surgical History  Past Surgical History:   Procedure Laterality Date    CERVICAL BIOPSY  10/2021    DILATION AND CURETTAGE OF UTERUS  2015    x3-2003/2008/2010/2015    SLEEVE GASTROPLASTY  08/2018    TUBAL LIGATION  2020       Social History   reports that she quit smoking about 17 years ago. Her smoking use included cigarettes. She has never used smokeless tobacco. She reports current alcohol use. She reports current drug use. Drug: Marijuana.     Family History  family history includes Diabetes in her mother; Kidney failure in her mother; Leukemia in her father; Obesity in her father and mother; cardiac disorder in her father and mother.   No family history of stomach or colon cancer    Allergies  Allergies   Allergen Reactions    Aspirin Unknown    Fish Containing Products Unknown       Medications  Current Outpatient Medications   Medication Instructions    albuterol 2.5 mg /3 mL (0.083 %) nebulizer solution 1 UNIT DOSE AS NEEDED - RARELY    albuterol 90 mcg/actuation inhaler 2 puffs, inhalation, Every 6 hours PRN    Ca-D3-mag ox-zinc--rosario-bor (Calcium 600-D3 Plus, mag-zinc,) 600 mg calcium- 20 mcg-50 mg tablet 1 tablet, oral, 2 times daily    cyanocobalamin (Vitamin B-12) 500 mcg tablet 1 tablet, oral, Daily    Lactobac. rhamnosus GG-inulin 20 billion cell -200 mg capsule 1 tablet, oral, Daily    metFORMIN (GLUCOPHAGE) 500 mg, oral, Daily with breakfast    methylPREDNISolone (Medrol Dospak) 4 mg tablets Follow schedule on package instructions    omeprazole (PRILOSEC) 40 mg, oral, Daily, Open capsule, sprinkle in SF applesauce or pudding, swallow. DO NOT CHEW    pantoprazole (PROTONIX) 40 mg, oral, 2 times daily, Do not crush, chew, or split.    polyethylene glycol (GLYCOLAX, MIRALAX) 17 g, oral, 3 times weekly    sertraline (ZOLOFT) 100 mg, oral, Daily    simethicone (MYLICON) 80 mg, oral, Every 6 hours PRN        Objective   Visit  Vitals  /73   Pulse 84        Physical Exam  Constitutional:       General: She is not in acute distress.     Appearance: Normal appearance.   HENT:      Mouth/Throat:      Mouth: Mucous membranes are moist.      Pharynx: Oropharynx is clear.   Eyes:      General: No scleral icterus.     Extraocular Movements: Extraocular movements intact.      Conjunctiva/sclera: Conjunctivae normal.      Pupils: Pupils are equal, round, and reactive to light.   Cardiovascular:      Rate and Rhythm: Normal rate and regular rhythm.      Heart sounds: No murmur heard.  Pulmonary:      Effort: Pulmonary effort is normal.      Breath sounds: No wheezing or rhonchi.   Abdominal:      General: Bowel sounds are normal. There is no distension.      Palpations: Abdomen is soft. There is no mass.      Tenderness: There is no abdominal tenderness.      Hernia: No hernia is present.   Musculoskeletal:         General: No swelling or deformity.   Skin:     General: Skin is warm.      Coloration: Skin is not jaundiced.   Neurological:      General: No focal deficit present.      Mental Status: She is alert and oriented to person, place, and time.   Psychiatric:         Mood and Affect: Mood normal.         LABS  Pertinent labs were reviewed with the patient  Lab Results   Component Value Date    WBC 7.0 09/30/2023    WBC 6.3 07/06/2023    WBC 5.9 02/15/2022    HGB 10.9 (L) 09/30/2023    HGB 11.0 (L) 07/06/2023    HGB 10.8 (L) 02/15/2022    HCT 35.6 (L) 09/30/2023    HCT 35.5 (L) 07/06/2023    HCT 36.4 02/15/2022     09/30/2023     07/06/2023     02/15/2022      Lab Results   Component Value Date     09/30/2023    K 3.9 09/30/2023     09/30/2023    CO2 27 09/30/2023    BUN 7 09/30/2023    CREATININE 0.63 09/30/2023    GLUCOSE 85 09/30/2023    CALCIUM 8.2 (L) 09/30/2023      Lab Results   Component Value Date    ALKPHOS 51 09/30/2023    ALKPHOS 66 07/06/2023    ALKPHOS 63 12/15/2022    BILITOT 0.4 09/30/2023     BILITOT 0.4 07/06/2023    BILITOT 0.3 12/15/2022    PROT 5.9 (L) 09/30/2023    PROT 7.3 07/06/2023    PROT 6.1 (L) 12/15/2022    ALT 10 09/30/2023    ALT 11 07/06/2023    ALT 11 12/15/2022    AST 13 09/30/2023    AST 13 07/06/2023    AST 16 12/15/2022      Lab Results   Component Value Date    INR 1.1 07/06/2023      Lab Results   Component Value Date    IRON 64 07/06/2023    TIBC 393 07/06/2023      Lab Results   Component Value Date    FERRITIN 19 07/06/2023      Lab Results   Component Value Date    TSH 3.59 07/06/2023    FREET4 0.90 02/15/2022        Radiology  Pertinent radiology was reviewed with the patient  NA    Endoscopy  Colonoscopy NA    EGD by Dr Gonsalez 6/29/2022  - Z-line irregular, 36 cm from the incisors.  - 2 cm hiatal hernia.  - A sleeve gastrectomy was found, characterized by healthy appearing mucosa.  - Normal ampulla, duodenal bulb, first portion of the duodenum and second portion of the duodenum.  - The BRAVO pH capsule was positioned 30 cm from the incisors, which was 6 cm proximal to the GE junction.  - No specimens collected    Assessment/Plan   Katelynn Avery is a 38 y.o. female who presents to GI clinic for acid reflux.    GERD (gastroesophageal reflux disease)  On omeprazole 40 mg daily fo 3 months. Continues to have symptoms most days with night symptoms 1-2 times a week.   Bravo study done but interpretation not available, follow up notes from Dr Gonsalez says results are pending     Abdominal bloating  Recommend GasX/simethicone. Can use peppermint as first line. Samples of Ibgard given    Other constipation  Mild. Recommend miralax intermittently       Katelynn was seen today for gerd.  Diagnoses and all orders for this visit:  Gastroesophageal reflux disease, unspecified whether esophagitis present  -     EGD; Future  -     pantoprazole (ProtoNix) 40 mg EC tablet; Take 1 tablet (40 mg) by mouth 2 times a day. Do not crush, chew, or split.  Abnormality of esophagus  -     Referral to  Gastroenterology  Abdominal bloating  -     simethicone (Mylicon) 80 mg chewable tablet; Chew 1 tablet (80 mg) every 6 hours if needed for flatulence for up to 10 days.  Constipation, unspecified constipation type  -     polyethylene glycol (Glycolax, Miralax) 17 gram/dose powder; Take 17 g by mouth 3 (three) times a week.     We will see you again in 2 week after endoscopy   Moriah Lebron PA-C

## 2023-12-11 ENCOUNTER — OFFICE VISIT (OUTPATIENT)
Dept: GASTROENTEROLOGY | Facility: CLINIC | Age: 38
End: 2023-12-11
Payer: COMMERCIAL

## 2023-12-11 VITALS
HEART RATE: 84 BPM | SYSTOLIC BLOOD PRESSURE: 107 MMHG | DIASTOLIC BLOOD PRESSURE: 73 MMHG | WEIGHT: 245.6 LBS | HEIGHT: 63 IN | BODY MASS INDEX: 43.52 KG/M2

## 2023-12-11 DIAGNOSIS — R14.0 ABDOMINAL BLOATING: ICD-10-CM

## 2023-12-11 DIAGNOSIS — K21.9 GASTROESOPHAGEAL REFLUX DISEASE, UNSPECIFIED WHETHER ESOPHAGITIS PRESENT: Primary | ICD-10-CM

## 2023-12-11 DIAGNOSIS — K59.00 CONSTIPATION, UNSPECIFIED CONSTIPATION TYPE: ICD-10-CM

## 2023-12-11 DIAGNOSIS — K22.9 ABNORMALITY OF ESOPHAGUS: ICD-10-CM

## 2023-12-11 PROBLEM — K59.09 OTHER CONSTIPATION: Status: ACTIVE | Noted: 2023-12-11

## 2023-12-11 PROCEDURE — 1036F TOBACCO NON-USER: CPT | Performed by: PHYSICIAN ASSISTANT

## 2023-12-11 PROCEDURE — 99205 OFFICE O/P NEW HI 60 MIN: CPT | Performed by: PHYSICIAN ASSISTANT

## 2023-12-11 RX ORDER — PANTOPRAZOLE SODIUM 40 MG/1
40 TABLET, DELAYED RELEASE ORAL 2 TIMES DAILY
Qty: 60 TABLET | Refills: 11 | Status: SHIPPED | OUTPATIENT
Start: 2023-12-11 | End: 2024-12-10

## 2023-12-11 RX ORDER — SIMETHICONE 80 MG
80 TABLET,CHEWABLE ORAL EVERY 6 HOURS PRN
Qty: 30 TABLET | Refills: 0 | Status: SHIPPED | OUTPATIENT
Start: 2023-12-11 | End: 2023-12-21

## 2023-12-11 RX ORDER — POLYETHYLENE GLYCOL 3350 17 G/17G
17 POWDER, FOR SOLUTION ORAL 3 TIMES WEEKLY
Qty: 204 G | Refills: 11 | Status: SHIPPED | OUTPATIENT
Start: 2023-12-11 | End: 2024-12-10

## 2023-12-11 ASSESSMENT — ENCOUNTER SYMPTOMS
SORE THROAT: 1
DYSURIA: 0
UNEXPECTED WEIGHT CHANGE: 0
CONFUSION: 0
CHILLS: 0
ARTHRALGIAS: 0
HEADACHES: 1
EYE PAIN: 1
SHORTNESS OF BREATH: 0
MYALGIAS: 0
HEMATURIA: 0
WEAKNESS: 0
COUGH: 1
WHEEZING: 0
FEVER: 0
DIZZINESS: 0
NUMBNESS: 0
JOINT SWELLING: 0
TROUBLE SWALLOWING: 0
APPETITE CHANGE: 0

## 2023-12-11 NOTE — ASSESSMENT & PLAN NOTE
On omeprazole 40 mg daily fo 3 months. Continues to have symptoms most days with night symptoms 1-2 times a week.   Bravo study done but interpretation not available, follow up notes from Dr Gonsalez says results are pending

## 2023-12-11 NOTE — PATIENT INSTRUCTIONS
Thank you for seeing us in clinic today!     In summary, please do the following:  We will schedule you for your EGD at [facility] .  You will need a  the day of the exam  Recommend taking pantoprazole 40 mg BID, 30 minutes before the first meal of the day and again before bed  Take Miralax every day other day to assist with constipation. Can go down to once of twice a week  Can take simethicone/GaxS. Peppermint oil (Ibgard)  [facility] - ENT    Lifestyle Changes to Improve Gastroesophageal Reflux Symptoms     Do not sleep flat:  Your head and chest need to be least 30 degrees above your belly  This allows gravity to help keep the stomach's contents in the stomach.   Use a bed wedge pillow or an anti-reflux pillow.    Do not use piles of pillows - may increase pressure on the abdomen              - Sleep on your left side - Reduces reflux because of the way your stomach curves.               - a body pillow can help keep you on your left  See: www.[employer].com for an integrated Anti-reflux sleep system           For assist with insurance coverage: http://[employer]/insurance-coverage-information/     Make sure that you are taking your Proton Pump Inhibitor medication (such as omeprazole [Prilosec] and like medications) correctly. Take them 30 minutes BEFORE Breakfast and/or Dinner, as instructed - they are more effective when take before the meal!  Eat moderate portions of food and smaller meals.   Avoid lying down within 3 hours of eating  Avoid bedtime snacks.   Maintain a healthy weight   extra pounds increase intra-abdominal pressure   Limit consumption of foods that relax the lower esophageal sphincter - if they are known to bother you.    Examples:  fatty foods, chocolate, peppermint, coffee, tea, grecia, and alcohol   Avoid foods which contribute additional acid that can irritate the esophagus  Examples:  tomatoes and citrus fruits or juices  If you smoke, you need to stop.  Smoking relaxes the lower  esophageal sphincter.  Ask about smoking cessation tips  Wear loose belts and clothing.   Try taking the antacid GavisconTR (aluminum hydroxide/ magnesium carbonate) before bedtime          We will see you again in 2 week after endoscopy   If you have any questions or concerns, please call the office at [phone]

## 2023-12-29 ENCOUNTER — OFFICE VISIT (OUTPATIENT)
Dept: CARDIOLOGY | Facility: CLINIC | Age: 38
End: 2023-12-29
Payer: COMMERCIAL

## 2023-12-29 VITALS
WEIGHT: 260 LBS | DIASTOLIC BLOOD PRESSURE: 70 MMHG | SYSTOLIC BLOOD PRESSURE: 110 MMHG | HEART RATE: 71 BPM | HEIGHT: 63 IN | BODY MASS INDEX: 46.07 KG/M2

## 2023-12-29 DIAGNOSIS — Z01.810 PREOPERATIVE CARDIOVASCULAR EXAMINATION: ICD-10-CM

## 2023-12-29 DIAGNOSIS — K21.9 GASTROESOPHAGEAL REFLUX DISEASE WITHOUT ESOPHAGITIS: ICD-10-CM

## 2023-12-29 DIAGNOSIS — G47.33 OBSTRUCTIVE SLEEP APNEA SYNDROME: ICD-10-CM

## 2023-12-29 DIAGNOSIS — Z01.810 PRE-OPERATIVE CARDIOVASCULAR EXAMINATION: Primary | ICD-10-CM

## 2023-12-29 DIAGNOSIS — Z90.3 HISTORY OF SLEEVE GASTRECTOMY: ICD-10-CM

## 2023-12-29 DIAGNOSIS — Z98.84 BARIATRIC SURGERY STATUS: ICD-10-CM

## 2023-12-29 PROCEDURE — 1036F TOBACCO NON-USER: CPT | Performed by: INTERNAL MEDICINE

## 2023-12-29 PROCEDURE — 93000 ELECTROCARDIOGRAM COMPLETE: CPT | Performed by: INTERNAL MEDICINE

## 2023-12-29 PROCEDURE — 99213 OFFICE O/P EST LOW 20 MIN: CPT | Performed by: INTERNAL MEDICINE

## 2023-12-29 NOTE — PROGRESS NOTES
CARDIOLOGY NEW PATIENT OFFICE VISIT    Patient:  Katelynn Avery  YOB: 1985  MRN: [id number]       History of Present Illness:     Katelynn Avery is a 38 y.o. female who is being seen today at the request of Dr. Malini Foster for evaluation of cardiac status prior to planned bariatric surgery.  She does not have any history of atherosclerotic heart or valvular heart disease.  She has a history of gastroesophageal reflux disease.  She underwent previous sleeve gastrectomy.  She is scheduled for revision of the gastric sleeve by Dr Gonsalez.  She quit smoking tobacco about 17 years ago.  No history of hypertension, hyperlipidemia, or diabetes mellitus.  She does have a history of asthma and obstructive sleep apnea.    Sigrid states that she has been feeling well.  She works in [employer] and states that she is very active.  She denies any significant limitations.  She denies any recent chest pain or shortness breath.  She denies any orthopnea, PND, or increasing peripheral edema.  She denies any palpitations, lightheadedness, near-syncope, or syncope.  She denies any fever, chills, or cough.  She denies any nausea, vomiting, or diaphoresis.  She denies any hemoptysis, hematemesis, melena, or hematochezia.    Lab studies dated September 30, 2023 showed a normal CBC with exception of hemoglobin 10.9 and hematocrit 35.6.  Comprehensive metabolic profile was normal with exception of calcium 8.2.  Cholesterol 165 triglycerides 82, HDL 35, and .  EKG shows normal sinus rhythm and is a normal tracing.    She is well compensated from a cardiac standpoint.  She does not have any anginal or CHF symptoms.  No cardiac dysrhythmias.  Her blood pressures are well-controlled.  EKG is normal.  Functional capacity is well in excess of 6 METS.  She is reasonably low risk to proceed with gastric sleeve revision surgery as planned.  Other details as noted below.    The above portion of this note was  dictated by me using voice recognition software.  I personally performed the services described in the documentation.  The scribe entering the documentation below was in my presence.  I affirm that the information is both accurate and complete.      Allergies:     Allergies   Allergen Reactions    Aspirin Unknown    Fish Containing Products Unknown          Outpatient Medications:     Current Outpatient Medications   Medication Instructions    albuterol 2.5 mg /3 mL (0.083 %) nebulizer solution 1 UNIT DOSE AS NEEDED - RARELY    albuterol 90 mcg/actuation inhaler 2 puffs, inhalation, Every 6 hours PRN    Ca-D3-mag ox-zinc--rosario-bor (Calcium 600-D3 Plus, mag-zinc,) 600 mg calcium- 20 mcg-50 mg tablet 1 tablet, oral, 2 times daily    cyanocobalamin (vitamin B-12) 1,000 mcg, injection, Every 28 days    Lactobac. rhamnosus GG-inulin 20 billion cell -200 mg capsule 1 tablet, oral, Daily    metFORMIN (GLUCOPHAGE) 500 mg, oral, Daily with breakfast    methylPREDNISolone (Medrol Dospak) 4 mg tablets Follow schedule on package instructions    omeprazole (PRILOSEC) 40 mg, oral, Daily, Open capsule, sprinkle in SF applesauce or pudding, swallow. DO NOT CHEW    pantoprazole (PROTONIX) 40 mg, oral, 2 times daily, Do not crush, chew, or split.    polyethylene glycol (GLYCOLAX, MIRALAX) 17 g, oral, 3 times weekly    sertraline (ZOLOFT) 100 mg, oral, Daily        Past Medical History:     Past Medical History:   Diagnosis Date    Pap test, as part of routine gynecological examination 2021    LGSIL, hpv negative-gyn    Recurrent pregnancy loss        Social History:     Social History     Tobacco Use    Smoking status: Former     Types: Cigarettes     Quit date:      Years since quittin.0    Smokeless tobacco: Never   Substance Use Topics    Alcohol use: Yes     Comment: social - 1-2 drinks a month    Drug use: Yes     Types: Marijuana     Comment: once a month       Family History:     Family History   Problem  Relation Name Age of Onset    Other (cardiac disorder) Mother      Diabetes Mother      Kidney failure Mother      Obesity Mother          morbid    Other (cardiac disorder) Father      Leukemia Father      Obesity Father          morbid       Review of Systems:     12 point review of systems completed and is negative other than that detailed above.       Objective:     Vitals:    12/29/23 1343   BP: 110/70   Pulse: 71       Wt Readings from Last 4 Encounters:   12/29/23 118 kg (260 lb)   12/11/23 111 kg (245 lb 9.6 oz)   09/26/23 115 kg (254 lb)   09/07/22 125 kg (275 lb)       Physical Examination:   GENERAL:  Well developed, well nourished, in no acute distress.  CHEST:  Symmetric and nontender.  NEURO/PSYCH:  Alert and oriented times three with approppriate behavior and responses.  NECK:  Supple, no JVD, no bruit.  LUNGS:  Clear to auscultation bilaterally, normal respiratory effort.  HEART:  Rate and rhythm regular with no evident murmur, no gallop appreciated.        There are no rubs, clicks or heaves.  EXTREMITIES:  Warm with good color, no clubbing or cyanosis.  There is no edema noted.  PERIPHERAL VASCULAR:  Pulses present and equally palpable; 2+ throughout.      Lab:     CBC:   Lab Results   Component Value Date    WBC 7.0 09/30/2023    RBC 4.19 09/30/2023    HGB 10.9 (L) 09/30/2023    HCT 35.6 (L) 09/30/2023     09/30/2023        CMP:    Lab Results   Component Value Date     09/30/2023    K 3.9 09/30/2023     09/30/2023    CO2 27 09/30/2023    BUN 7 09/30/2023    CREATININE 0.63 09/30/2023    GLUCOSE 85 09/30/2023    CALCIUM 8.2 (L) 09/30/2023       Magnesium:    Lab Results   Component Value Date    MG 2.00 04/07/2020       Lipid Profile:    Lab Results   Component Value Date    TRIG 82 07/06/2023    HDL 35.5 (A) 07/06/2023       TSH:    Lab Results   Component Value Date    TSH 3.59 07/06/2023       PT/INR:    Lab Results   Component Value Date    PROTIME 12.7 07/06/2023    INR 1.1  07/06/2023       HgBA1c:    Lab Results   Component Value Date    HGBA1C 5.3 09/30/2023       BMP:  Lab Results   Component Value Date     09/30/2023     07/06/2023     12/15/2022     02/15/2022    K 3.9 09/30/2023    K 3.5 07/06/2023    K 4.1 12/15/2022    K 4.0 02/15/2022     09/30/2023     07/06/2023     (H) 12/15/2022     02/15/2022    CO2 27 09/30/2023    CO2 23 07/06/2023    CO2 28 12/15/2022    CO2 27 02/15/2022    BUN 7 09/30/2023    BUN 13 07/06/2023    BUN 11 12/15/2022    BUN 14 02/15/2022    CREATININE 0.63 09/30/2023    CREATININE 0.66 07/06/2023    CREATININE 0.62 12/15/2022    CREATININE 0.57 02/15/2022       CBC:  Lab Results   Component Value Date    WBC 7.0 09/30/2023    WBC 6.3 07/06/2023    WBC 5.9 02/15/2022    WBC 5.9 08/30/2021    RBC 4.19 09/30/2023    RBC 4.17 07/06/2023    RBC 3.95 (L) 02/15/2022    RBC 3.98 (L) 08/30/2021    HGB 10.9 (L) 09/30/2023    HGB 11.0 (L) 07/06/2023    HGB 10.8 (L) 02/15/2022    HGB 8.8 (L) 08/30/2021    HCT 35.6 (L) 09/30/2023    HCT 35.5 (L) 07/06/2023    HCT 36.4 02/15/2022    HCT 31.2 (L) 08/30/2021    MCV 85 09/30/2023    MCV 85 07/06/2023    MCV 92 02/15/2022    MCV 78 (L) 08/30/2021    MCH 26.0 09/30/2023    MCHC 30.6 (L) 09/30/2023    MCHC 31.0 (L) 07/06/2023    MCHC 29.7 (L) 02/15/2022    MCHC 28.2 (L) 08/30/2021    RDW 14.7 (H) 09/30/2023    RDW 14.1 07/06/2023    RDW 13.4 02/15/2022    RDW 15.3 (H) 08/30/2021     09/30/2023     07/06/2023     02/15/2022     08/30/2021    MPV 10.9 09/30/2023       Hepatic Function Panel:    Lab Results   Component Value Date    ALKPHOS 51 09/30/2023    ALT 10 09/30/2023    AST 13 09/30/2023    PROT 5.9 (L) 09/30/2023    BILITOT 0.4 09/30/2023       Problem List:     Patient Active Problem List   Diagnosis    Abdominal obesity and metabolic syndrome    Abnormal Pap smear of cervix    Anemia    Anemia due to chronic kidney disease    Anxiety and  depression    Asthma    Sleep apnea    Bariatric surgery status    Chronic pain of right knee    GERD (gastroesophageal reflux disease)    Hearing loss    Hiatal hernia    History of sleeve gastrectomy    Calcium deficiency    Iron deficiency    Irritable bowel syndrome complicating pregnancy    Pap smear abnormality of cervix with ASCUS favoring benign    Polyphagia    Postoperative malabsorption    Primary osteoarthritis of right knee    Psychological factors affecting morbid obesity (CMS/HCC)    Sensorineural hearing loss (SNHL) of right ear with unrestricted hearing of left ear    Tinnitus of right ear    Vitamin A deficiency    Vitamin D deficiency    Vitamin B 12 deficiency    Vitamin B1 deficiency    Weight gain following gastric bypass surgery    Abdominal bloating    Other constipation       Assessment:     Problem List Items Addressed This Visit             ICD-10-CM    Sleep apnea G47.30    Bariatric surgery status Z98.84    GERD (gastroesophageal reflux disease) K21.9    History of sleeve gastrectomy Z90.3    Preoperative cardiovascular examination Z01.810     Other Visit Diagnoses         Codes    Pre-operative cardiovascular examination    -  Primary Z01.810    Relevant Orders    Follow Up In Cardiology

## 2024-01-08 ENCOUNTER — APPOINTMENT (OUTPATIENT)
Dept: CARDIOLOGY | Facility: CLINIC | Age: 39
End: 2024-01-08
Payer: COMMERCIAL

## 2024-01-15 ENCOUNTER — OFFICE VISIT (OUTPATIENT)
Dept: OTOLARYNGOLOGY | Facility: CLINIC | Age: 39
End: 2024-01-15
Payer: COMMERCIAL

## 2024-01-15 VITALS
TEMPERATURE: 99.3 F | DIASTOLIC BLOOD PRESSURE: 76 MMHG | BODY MASS INDEX: 46.14 KG/M2 | HEIGHT: 63 IN | WEIGHT: 260.4 LBS | SYSTOLIC BLOOD PRESSURE: 127 MMHG

## 2024-01-15 DIAGNOSIS — R94.6 ABNORMAL THYROID EXAM: ICD-10-CM

## 2024-01-15 PROCEDURE — 99203 OFFICE O/P NEW LOW 30 MIN: CPT | Performed by: OTOLARYNGOLOGY

## 2024-01-15 PROCEDURE — 1036F TOBACCO NON-USER: CPT | Performed by: OTOLARYNGOLOGY

## 2024-01-15 NOTE — PROGRESS NOTES
ENT Outpatient Consultation    Chief Complaint: thyroid nodule  History Of Present Illness  Katelynn Avery is a 38 y.o. female referred by Dr. Foster for evaluation of a thyroid nodule.  This was found incidentally on a CT scan of the chest.  A follow-up thyroid ultrasound was obtained showing a 2.3 cm TI-RADS 2 nodule.  Her thyroid function is normal.  She has no family history of thyroid cancer.  She is asymptomatic.     Past Medical History  She has a past medical history of Pap test, as part of routine gynecological examination (08/2021) and Recurrent pregnancy loss.    Surgical History  She has a past surgical history that includes Dilation and curettage of uterus (2015); Cervical biopsy (10/2021); Sleeve Gastroplasty (08/2018); and Tubal ligation (2020).     Social History  She reports that she quit smoking about 18 years ago. Her smoking use included cigarettes. She has never used smokeless tobacco. She reports current alcohol use. She reports current drug use. Drug: Marijuana.    Family History  Family History   Problem Relation Name Age of Onset    Other (cardiac disorder) Mother      Diabetes Mother      Kidney failure Mother      Obesity Mother          morbid    Other (cardiac disorder) Father      Leukemia Father      Obesity Father          morbid        Allergies  Aspirin and Fish containing products     Physical Exam:  CONSTITUTIONAL:  No acute distress  VOICE:  No hoarseness or other abnormality  RESPIRATION:  Breathing comfortably, no stridor  CV:  No clubbing/cyanosis/edema in hands  EYES:  EOM intact, sclera normal  NEURO:  Alert and oriented times 3, Cranial nerves II-XII grossly intact and symmetric bilaterally  HEAD AND FACE:  Symmetric facial features, no masses or lesions, sinuses non-tender to palpation  SALIVARY GLANDS:  Parotid and submandibular glands normal bilaterally  EARS:  Normal external ears, external auditory canals, and TMs to otoscopy, normal hearing to whispered  "voice.  NOSE:  External nose midline, anterior rhinoscopy is normal with limited visualization to the anterior aspect of the interior turbinates, no bleeding or drainage, no lesions  ORAL CAVITY/OROPHARYNX/LIPS:  Normal mucous membranes, normal floor of mouth/tongue/OP, no masses or lesions  PHARYNGEAL WALLS:  No masses or lesions  NECK/LYMPH:  No LAD, no palpable thyroid masses, trachea midline  SKIN:  Neck skin is without scar or injury  PSYCH:  Alert and oriented with appropriate mood and affect     Last Recorded Vitals  Blood pressure 127/76, temperature 37.4 °C (99.3 °F), temperature source Temporal, height 1.6 m (5' 3\"), weight 118 kg (260 lb 6.4 oz).    Relevant Results  CT scan of the chest and thyroid ultrasound results were reviewed      Assessment and Plan  38 y.o. female with 2.3 cm TI-RADS 2 left-sided thyroid nodule.  We discussed indications for surveillance and biopsy.  She currently does not meet criteria for biopsy.  Would recommend another thyroid ultrasound in 1 year.  Order was placed in the system.  She can follow-up afterwards to review    Jv Martinez MD   "

## 2024-01-23 DIAGNOSIS — F41.9 ANXIETY AND DEPRESSION: ICD-10-CM

## 2024-01-23 DIAGNOSIS — F32.A ANXIETY AND DEPRESSION: ICD-10-CM

## 2024-01-24 RX ORDER — SERTRALINE HYDROCHLORIDE 100 MG/1
100 TABLET, FILM COATED ORAL DAILY
Qty: 90 TABLET | Refills: 0 | Status: SHIPPED | OUTPATIENT
Start: 2024-01-24 | End: 2024-02-12 | Stop reason: SDUPTHER

## 2024-01-25 RX ORDER — SODIUM CHLORIDE, SODIUM LACTATE, POTASSIUM CHLORIDE, CALCIUM CHLORIDE 600; 310; 30; 20 MG/100ML; MG/100ML; MG/100ML; MG/100ML
20 INJECTION, SOLUTION INTRAVENOUS CONTINUOUS
Status: CANCELLED | OUTPATIENT
Start: 2024-01-25

## 2024-01-26 ENCOUNTER — ANESTHESIA EVENT (OUTPATIENT)
Dept: GASTROENTEROLOGY | Facility: HOSPITAL | Age: 39
End: 2024-01-26
Payer: COMMERCIAL

## 2024-01-26 ENCOUNTER — ANESTHESIA (OUTPATIENT)
Dept: GASTROENTEROLOGY | Facility: HOSPITAL | Age: 39
End: 2024-01-26
Payer: COMMERCIAL

## 2024-01-26 ENCOUNTER — HOSPITAL ENCOUNTER (OUTPATIENT)
Dept: GASTROENTEROLOGY | Facility: HOSPITAL | Age: 39
Discharge: HOME | End: 2024-01-26
Payer: COMMERCIAL

## 2024-01-26 VITALS
HEIGHT: 63 IN | OXYGEN SATURATION: 98 % | WEIGHT: 259.26 LBS | HEART RATE: 61 BPM | TEMPERATURE: 96.8 F | DIASTOLIC BLOOD PRESSURE: 71 MMHG | BODY MASS INDEX: 45.94 KG/M2 | SYSTOLIC BLOOD PRESSURE: 124 MMHG | RESPIRATION RATE: 18 BRPM

## 2024-01-26 DIAGNOSIS — K21.9 GASTROESOPHAGEAL REFLUX DISEASE, UNSPECIFIED WHETHER ESOPHAGITIS PRESENT: ICD-10-CM

## 2024-01-26 PROBLEM — E66.813 CLASS 3 SEVERE OBESITY IN ADULT: Status: ACTIVE | Noted: 2024-01-26

## 2024-01-26 PROBLEM — E66.01 CLASS 3 SEVERE OBESITY IN ADULT (MULTI): Status: ACTIVE | Noted: 2024-01-26

## 2024-01-26 LAB — PREGNANCY TEST URINE, POC: NEGATIVE

## 2024-01-26 PROCEDURE — 2500000004 HC RX 250 GENERAL PHARMACY W/ HCPCS (ALT 636 FOR OP/ED): Performed by: NURSE ANESTHETIST, CERTIFIED REGISTERED

## 2024-01-26 PROCEDURE — 7100000009 HC PHASE TWO TIME - INITIAL BASE CHARGE

## 2024-01-26 PROCEDURE — 43239 EGD BIOPSY SINGLE/MULTIPLE: CPT | Performed by: STUDENT IN AN ORGANIZED HEALTH CARE EDUCATION/TRAINING PROGRAM

## 2024-01-26 PROCEDURE — 2500000004 HC RX 250 GENERAL PHARMACY W/ HCPCS (ALT 636 FOR OP/ED): Performed by: STUDENT IN AN ORGANIZED HEALTH CARE EDUCATION/TRAINING PROGRAM

## 2024-01-26 PROCEDURE — 7100000010 HC PHASE TWO TIME - EACH INCREMENTAL 1 MINUTE

## 2024-01-26 PROCEDURE — 81025 URINE PREGNANCY TEST: CPT | Performed by: STUDENT IN AN ORGANIZED HEALTH CARE EDUCATION/TRAINING PROGRAM

## 2024-01-26 PROCEDURE — 3700000002 HC GENERAL ANESTHESIA TIME - EACH INCREMENTAL 1 MINUTE

## 2024-01-26 PROCEDURE — 88305 TISSUE EXAM BY PATHOLOGIST: CPT | Performed by: PATHOLOGY

## 2024-01-26 PROCEDURE — 88305 TISSUE EXAM BY PATHOLOGIST: CPT | Mod: TC,SUR,ELYLAB | Performed by: STUDENT IN AN ORGANIZED HEALTH CARE EDUCATION/TRAINING PROGRAM

## 2024-01-26 PROCEDURE — 3700000001 HC GENERAL ANESTHESIA TIME - INITIAL BASE CHARGE

## 2024-01-26 PROCEDURE — 2500000005 HC RX 250 GENERAL PHARMACY W/O HCPCS: Performed by: NURSE ANESTHETIST, CERTIFIED REGISTERED

## 2024-01-26 RX ORDER — METHYLPREDNISOLONE 4 MG/1
4 TABLET ORAL
COMMUNITY
End: 2024-04-16 | Stop reason: ALTCHOICE

## 2024-01-26 RX ORDER — SODIUM CHLORIDE, SODIUM LACTATE, POTASSIUM CHLORIDE, CALCIUM CHLORIDE 600; 310; 30; 20 MG/100ML; MG/100ML; MG/100ML; MG/100ML
50 INJECTION, SOLUTION INTRAVENOUS CONTINUOUS
Status: DISCONTINUED | OUTPATIENT
Start: 2024-01-26 | End: 2024-01-27 | Stop reason: HOSPADM

## 2024-01-26 RX ORDER — LIDOCAINE HYDROCHLORIDE 20 MG/ML
INJECTION, SOLUTION EPIDURAL; INFILTRATION; INTRACAUDAL; PERINEURAL AS NEEDED
Status: DISCONTINUED | OUTPATIENT
Start: 2024-01-26 | End: 2024-01-26

## 2024-01-26 RX ORDER — PROPOFOL 10 MG/ML
INJECTION, EMULSION INTRAVENOUS AS NEEDED
Status: DISCONTINUED | OUTPATIENT
Start: 2024-01-26 | End: 2024-01-26

## 2024-01-26 RX ADMIN — PROPOFOL 100 MG: 10 INJECTION, EMULSION INTRAVENOUS at 14:59

## 2024-01-26 RX ADMIN — PROPOFOL 50 MG: 10 INJECTION, EMULSION INTRAVENOUS at 15:01

## 2024-01-26 RX ADMIN — PROPOFOL 50 MG: 10 INJECTION, EMULSION INTRAVENOUS at 15:03

## 2024-01-26 RX ADMIN — SODIUM CHLORIDE, POTASSIUM CHLORIDE, SODIUM LACTATE AND CALCIUM CHLORIDE 50 ML/HR: 600; 310; 30; 20 INJECTION, SOLUTION INTRAVENOUS at 14:04

## 2024-01-26 RX ADMIN — LIDOCAINE HYDROCHLORIDE 100 MG: 20 INJECTION, SOLUTION EPIDURAL; INFILTRATION; INTRACAUDAL; PERINEURAL at 14:59

## 2024-01-26 SDOH — HEALTH STABILITY: MENTAL HEALTH: CURRENT SMOKER: 0

## 2024-01-26 ASSESSMENT — PAIN SCALES - GENERAL
PAINLEVEL_OUTOF10: 0 - NO PAIN
PAIN_LEVEL: 0

## 2024-01-26 ASSESSMENT — PAIN - FUNCTIONAL ASSESSMENT
PAIN_FUNCTIONAL_ASSESSMENT: 0-10

## 2024-01-26 ASSESSMENT — COLUMBIA-SUICIDE SEVERITY RATING SCALE - C-SSRS
1. IN THE PAST MONTH, HAVE YOU WISHED YOU WERE DEAD OR WISHED YOU COULD GO TO SLEEP AND NOT WAKE UP?: NO
6. HAVE YOU EVER DONE ANYTHING, STARTED TO DO ANYTHING, OR PREPARED TO DO ANYTHING TO END YOUR LIFE?: NO
2. HAVE YOU ACTUALLY HAD ANY THOUGHTS OF KILLING YOURSELF?: NO

## 2024-01-26 NOTE — DISCHARGE INSTRUCTIONS
Upper GI Endoscopy Discharge Instructions    About this topic  This procedure is done to view your upper gastrointestinal (GI) tract. This includes your throat and food pipe (esophagus). It also includes your stomach and the first part of the small bowel. Some people have this test for problems like coughing or throwing up blood. Other people may be having bad belly pain or blood in their stool. You may be having trouble swallowing or problems with acid reflux.  Doctors often use this test to look for problems like:  Ulcers  Cancer or tumor growths  Internal bleeding  Swelling  Inflammation  Infection  Sharma's esophagus  Gastroesophageal reflux disease or GERD  Swallowing problems    What care is needed at home?  Ask your doctor what you need to do when you go home. Make sure you ask questions if you do not understand what the doctor says. This way you will know what you need to do.  Your throat may feel sore. Your belly may also feel bloated. Your doctor may give you drugs to help with pain.  Talk to your doctor about when it is safe for you to go back to eating your normal diet and taking your drugs. You can drink fluid once the numbing drugs in your throat wear off.  What follow-up care is needed?  Your doctor may ask you to make visits to the office to check on your progress. Be sure to keep these visits.  The results of this test may help your doctor understand what kind of problem you have with your upper GI tract. Together you can make a plan for more care.  What drugs may be needed?  The doctor may order drugs to:  Help with pain  Decrease the acid in your stomach  Will physical activity be limited?  You may need to limit your activity for the rest of the day. After that, you will likely be able to go back to your normal activities.  What changes to diet are needed?  Soft foods like pudding or soups may be easier to eat at first. Ask your doctor if you need to make any changes to your diet.  What problems  could happen?  Painful swallowing  Upset stomach  Injury to food pipe  Throwing up  Tear in the esophagus  When do I need to call the doctor?  Signs of infection. These include a fever of 100.4°F (38°C) or higher, chills.  Very bad belly pain  Throwing up blood  Your belly is hard and swollen  Trouble swallowing or breathing  Upset stomach and throwing up  Bloody or black tarry stools  Cough, shortness of breath, or chest pain  A crunching feeling under the skin in your neck  You are not feeling better in 2 to 3 days or you are feeling worse  Teach Back: Helping You Understand  The Teach Back Method helps you understand the information we are giving you. After you talk with the staff, tell them in your own words what you learned. This helps to make sure the staff has described each thing clearly. It also helps to explain things that may have been confusing. Before going home, make sure you can do these:  I can tell you about my procedure.  I can tell you what changes I need to make with my diet or drugs.  I can tell you what I will do if I have very bad belly pain, throwing up blood, or my belly is hard and swollen.  Where can I learn more?  [employer]  https://www.gastro.org/practice-guidance/gi-patient-center/topic/upper-gi-endoscopy  Last Reviewed Date

## 2024-01-26 NOTE — ANESTHESIA POSTPROCEDURE EVALUATION
Patient: Katelynn Avery    Procedure Summary       Date: 01/26/24 Room / Location: [facility]    Anesthesia Start: 1456 Anesthesia Stop:     Procedure: EGD Diagnosis: Gastroesophageal reflux disease, unspecified whether esophagitis present    Scheduled Providers: Amrik Woody DO; Manav Escobar MD; Kristopher Smith RN; Marisela Brown Responsible Provider: Garcia Cotto MD    Anesthesia Type: MAC ASA Status: 3            Anesthesia Type: MAC    Vitals Value Taken Time   /56 01/26/24 1507   Temp 36 01/26/24 1507   Pulse 63 01/26/24 1507   Resp 15 01/26/24 1507   SpO2 96 01/26/24 1507       Anesthesia Post Evaluation    Patient location during evaluation: bedside  Patient participation: complete - patient participated  Level of consciousness: awake and alert  Pain score: 0  Pain management: adequate  Airway patency: patent  Cardiovascular status: acceptable and stable  Respiratory status: acceptable and room air  Hydration status: acceptable  Postoperative Nausea and Vomiting: none        No notable events documented.

## 2024-01-26 NOTE — H&P
"Outpatient Hospital Procedure H&P    Patient Profile  Name Katelynn Avery  Date of Birth 1985  MRN [id number]  PCP Malini Foster        Diagnosis: GERD.    Procedure(s):  EGD.    Allergies  Allergies   Allergen Reactions    Aspirin Other     \"Throat swells\"    Fish Containing Products Unknown     Pt. states NOT ALLERGIC to Iodine       Past Medical History   Past Medical History:   Diagnosis Date    Anemia     Anxiety     Arthritis     Asthma     Constipation     Depression     GERD (gastroesophageal reflux disease)     Irritable bowel syndrome     Migraines     Multiple personality disorder (CMS/HCC)     Pap test, as part of routine gynecological examination 08/2021    LGSIL, hpv negative-gyn    Recurrent pregnancy loss     Sleep apnea     Tinnitus     Wears glasses        Medication Reviewed - yes  Prior to Admission medications    Medication Sig Start Date End Date Taking? Authorizing Provider   Ca-D3-mag ox-zinc--rosario-bor (Calcium 600-D3 Plus, mag-zinc,) 600 mg calcium- 20 mcg-50 mg tablet Take 1 tablet by mouth in the morning and at bedtime. 3/23/21  Yes Historical Provider, MD   metFORMIN (Glucophage) 500 mg tablet Take 1 tablet (500 mg) by mouth once daily with a meal. 9/26/23 9/25/24 Yes Malini Foster MD   methylPREDNISolone (Medrol Dospak) 4 mg tablets Take 1 tablet (4 mg) by mouth. Follow schedule on package instructions   Yes Historical Provider, MD   pantoprazole (ProtoNix) 40 mg EC tablet Take 1 tablet (40 mg) by mouth 2 times a day. Do not crush, chew, or split. 12/11/23 12/10/24 Yes Moriah Lebron PA-C   sertraline (Zoloft) 100 mg tablet Take 1 tablet (100 mg) by mouth once daily. 1/24/24  Yes Malini Foster MD   albuterol 2.5 mg /3 mL (0.083 %) nebulizer solution 1 UNIT DOSE AS NEEDED - RARELY    Historical Provider, MD   albuterol 90 mcg/actuation inhaler Inhale 2 puffs every 6 hours if needed. 12/14/20   Historical Provider, MD   cyanocobalamin, vitamin B-12, 1,000 " mcg/mL kit Inject 1,000 mcg as directed every 28 (twenty-eight) days.    Historical Provider, MD   Lactobac. rhamnosus GG-inulin 20 billion cell -200 mg capsule Take 1 tablet by mouth in the morning. 4/20/20   Historical Provider, MD   polyethylene glycol (Glycolax, Miralax) 17 gram/dose powder Take 17 g by mouth 3 (three) times a week.  Patient not taking: Reported on 1/26/2024 12/11/23 12/10/24  Moriah Lebron PA-C   sertraline (Zoloft) 100 mg tablet Take 1 tablet (100 mg) by mouth once daily. 9/26/23 1/23/24  Malini Foster MD       Physical Exam  Vitals:    01/26/24 1400   BP: 120/77   Pulse: 59   Resp: 20   Temp: 36.2 °C (97.2 °F)   SpO2: 99%      Weight   Vitals:    01/26/24 1400   Weight: 118 kg (259 lb 4.2 oz)     BMI Body mass index is 45.93 kg/m².    General: A&Ox3, NAD.  HEENT: AT/NC.   CV: RRR. No murmur.  Resp: CTA bilaterally. No wheezing, rhonchi or rales.   GI: Soft, NT/ND. BSx4.  Extrem: No edema. Pulses intact.  Skin: No Jaundice.   Neuro: No focal deficits.   Psych: Normal mood and affect.        Sedation Plan: Deep Sedation.  Procedure Plan - pre-procedural (re)assesment completed by physician:  discharge/transfer patient when discharge criteria met    Amrik Woody DO  1/26/2024 2:48 PM

## 2024-01-26 NOTE — ANESTHESIA PREPROCEDURE EVALUATION
"Katelynn Avery is a 38 y.o. female here for:    EGD  With Amrik Woody,   Gastroesophageal reflux disease, unspecified whether esophagitis present    Relevant Problems   Endocrine   (+) Abdominal obesity and metabolic syndrome   (+) Class 3 severe obesity in adult (CMS/HCC)   (+) History of sleeve gastrectomy      GI   (+) GERD (gastroesophageal reflux disease)   (+) Hiatal hernia      Neuro/Psych   (+) Anxiety and depression      Pulmonary   (+) Asthma      Hematology   (+) Anemia      Respiratory   (+) Sleep apnea       Lab Results   Component Value Date    HGB 10.9 (L) 2023    HCT 35.6 (L) 2023    WBC 7.0 2023     2023     2023    K 3.9 2023     2023    CREATININE 0.63 2023    BUN 7 2023     EKG 2024  Normal sinus rhythm, normal EKG.     Social History     Tobacco Use   Smoking Status Former    Types: Cigarettes    Quit date:     Years since quittin.0   Smokeless Tobacco Never       Allergies   Allergen Reactions    Aspirin Other     \"Throat swells\"    Fish Containing Products Unknown     Pt. states NOT ALLERGIC to Iodine       Current Outpatient Medications   Medication Instructions    albuterol 2.5 mg /3 mL (0.083 %) nebulizer solution 1 UNIT DOSE AS NEEDED - RARELY    albuterol 90 mcg/actuation inhaler 2 puffs, inhalation, Every 6 hours PRN    Ca-D3-mag ox-zinc--rosario-bor (Calcium 600-D3 Plus, mag-zinc,) 600 mg calcium- 20 mcg-50 mg tablet 1 tablet, oral, 2 times daily    cyanocobalamin (vitamin B-12) 1,000 mcg, injection, Every 28 days    Lactobac. rhamnosus GG-inulin 20 billion cell -200 mg capsule 1 tablet, oral, Daily    metFORMIN (GLUCOPHAGE) 500 mg, oral, Daily with breakfast    pantoprazole (PROTONIX) 40 mg, oral, 2 times daily, Do not crush, chew, or split.    polyethylene glycol (GLYCOLAX, MIRALAX) 17 g, oral, 3 times weekly    sertraline (ZOLOFT) 100 mg, oral, Daily       Past Surgical History: "   Procedure Laterality Date    CERVICAL BIOPSY  10/2021    DILATION AND CURETTAGE OF UTERUS  2015    x3-2003/2008/2010/2015    SLEEVE GASTROPLASTY  08/2018    TUBAL LIGATION  2020    WISDOM TOOTH EXTRACTION         Family History   Problem Relation Name Age of Onset    Other (cardiac disorder) Mother      Diabetes Mother      Kidney failure Mother      Obesity Mother          morbid    Other (cardiac disorder) Father      Leukemia Father      Obesity Father          morbid       NPO Details:  No data recorded    Physical Exam    Airway  Mallampati: II  TM distance: >3 FB  Neck ROM: full     Cardiovascular - normal exam     Dental - normal exam     Pulmonary - normal exam     Abdominal - normal exam             Anesthesia Plan    History of general anesthesia?: yes  History of complications of general anesthesia?: no    ASA 3     MAC     The patient is not a current smoker.    intravenous induction   Anesthetic plan and risks discussed with patient.    Plan discussed with CRNA.

## 2024-01-26 NOTE — Clinical Note
Patient tolerated procedure well. Appears comfortable with no complaints of pain. VS stable. Arousable prior to transport. Patient transported to [facility] via cart.  Report called per CRNA.  Handoff completed.

## 2024-01-31 LAB
LABORATORY COMMENT REPORT: NORMAL
PATH REPORT.FINAL DX SPEC: NORMAL
PATH REPORT.GROSS SPEC: NORMAL
PATH REPORT.TOTAL CANCER: NORMAL

## 2024-02-01 ENCOUNTER — HOSPITAL ENCOUNTER (OUTPATIENT)
Dept: NEUROLOGY | Facility: CLINIC | Age: 39
Discharge: HOME | End: 2024-02-01
Payer: COMMERCIAL

## 2024-02-01 DIAGNOSIS — G56.02 CARPAL TUNNEL SYNDROME OF LEFT WRIST: ICD-10-CM

## 2024-02-01 PROCEDURE — 95913 NRV CNDJ TEST 13/> STUDIES: CPT | Performed by: SPECIALIST

## 2024-02-01 PROCEDURE — 95860 NEEDLE EMG 1 EXTREMITY: CPT | Mod: CCI | Performed by: SPECIALIST

## 2024-02-01 PROCEDURE — 95860 NEEDLE EMG 1 EXTREMITY: CPT | Performed by: SPECIALIST

## 2024-02-01 PROCEDURE — 95886 MUSC TEST DONE W/N TEST COMP: CPT | Performed by: SPECIALIST

## 2024-02-05 ENCOUNTER — OFFICE VISIT (OUTPATIENT)
Dept: ORTHOPEDIC SURGERY | Facility: CLINIC | Age: 39
End: 2024-02-05
Payer: COMMERCIAL

## 2024-02-05 DIAGNOSIS — G56.01 CARPAL TUNNEL SYNDROME OF RIGHT WRIST: ICD-10-CM

## 2024-02-05 DIAGNOSIS — G56.02 CARPAL TUNNEL SYNDROME OF LEFT WRIST: Primary | ICD-10-CM

## 2024-02-05 PROCEDURE — 1036F TOBACCO NON-USER: CPT | Performed by: STUDENT IN AN ORGANIZED HEALTH CARE EDUCATION/TRAINING PROGRAM

## 2024-02-05 PROCEDURE — 99214 OFFICE O/P EST MOD 30 MIN: CPT | Performed by: STUDENT IN AN ORGANIZED HEALTH CARE EDUCATION/TRAINING PROGRAM

## 2024-02-05 PROCEDURE — 99214 OFFICE O/P EST MOD 30 MIN: CPT | Mod: 57 | Performed by: STUDENT IN AN ORGANIZED HEALTH CARE EDUCATION/TRAINING PROGRAM

## 2024-02-05 NOTE — LETTER
February 5, 2024     Patient: Katelynn Avery   YOB: 1985   Date of Visit: 2/5/2024       To Whom It May Concern:    Katelynn Avery  was seen in my clinic on 2/5/24 , please excuse her absence and or tardiness to work to atten this appointment .    If you have any questions or concerns, please don't hesitate to call.         Sincerely,        Cheyanne Escobar MD

## 2024-02-05 NOTE — PROGRESS NOTES
History of Present Illness:  Presents with bilateral right greater than left hand numbness. The symptoms have been present for months. The patient denies any inciting trauma. The numbness primarily involves the thumb, index finger, and long finger. There is minimal numbness in the small finger. The patient complains of intermittant, moderate hand pain which is worse at night. It causes frequent night awakenings. The patient presents due to worsening symptoms.  They have tried nighttime bracing without improvement in sxs.     She works as a .  Is dropping objects.    Review of Systems   GENERAL: Negative for malaise, significant weight loss, fever  MUSCULOSKELETAL: see HPI  NEURO:  Negative    The patient's past medical history, family history, social history, and review of systems were reviewed. History is otherwise negative except as stated in the HPI.    Physical Examination:  General: Alert and oriented to person, place, and time.  No acute distress and breathing comfortably: Pleasant and cooperative with examination.  HEENT: Head is normocephalic and atraumatic.  Neck: Supple, no visible swelling.  Cardiovascular: No palpable tachycardia  Lungs: No audible wheezing or labored breathing  Abdomen: Nondistended.  On musculoskeletal examination bilateral the patient has full elbow range of motion. There is no tenderness to palpation about the lateral epicondyle. Tinels at the cubital tunnel and elbow flexion/compression are negative for ulnar nerve symptoms. In regards to the wrist, there is no obvious deformity. Range of motion is full in flexion, extension, pronation, and supination. Strength is 5/5 in flexion and extension. There is no tenderness to palpation about the 1st dorsal compartment, the 1st CMC joint, or the TFCC. Tinels at the carpal tunnel and Durkins compression test are positive. The patient has subjective paresthesias in the median nerve distribution. Sensation and motor function are  intact in the radial, and ulnar nerve distribution. There is no obvious thenar atrophy, and thenar strength is 5/5. There is no intrinsic atrophy, and intrinsic strength is 5/5. All fingers are without triggering and are without pain over the A1 pulley. The patient can make a full composite fist. The hand itself is warm and well perfused. The skin is intact throughout.      Imaging:  EMG of bilateral upper extremities demonstrates moderate carpal tunnel on the right and mild to moderate on the left    Assessment:  Patient with bilateral right greater than left carpal tunnel syndrome.  She has failed conservative therapy.  Brace is no longer working.    Plan:  She would like to proceed with right carpal tunnel release  surgery. Based on the history and physical exam, I believe that the patient has carpal tunnel syndrome. Given the duration/severity of symptoms and the failure of non-operative treatment, the patient wants to consider carpal tunnel release.  The benefit of surgery would be to stop the progression of symptoms, with the hope that the symptoms would also resolve. The risks of surgery include, but are not limited to, infection, bleeding, nerve/vessel injury, pillar pain, continued symptoms, and anesthetic complications. The patient understands the risks/benefits and consented to the surgery. Prior to the procedure, I discussed obtaining an EMG/NCV study to document severity and to assess prognosis. Given their classic presentation, the patient prefers to go directly to surgery without additional electrodiagnostic testing. I have answered all questions regarding the surgery itself and the post-operative course.    Cheyanne Castillo MD  Orthopaedic Surgeon

## 2024-02-08 PROBLEM — K58.9 IRRITABLE BOWEL SYNDROME: Status: ACTIVE | Noted: 2024-02-08

## 2024-02-08 PROBLEM — J30.2 SEASONAL ALLERGIES: Status: ACTIVE | Noted: 2022-01-31

## 2024-02-08 PROBLEM — R87.619 ABNORMAL PAP SMEAR OF CERVIX: Status: RESOLVED | Noted: 2023-02-10 | Resolved: 2024-02-08

## 2024-02-08 PROBLEM — F41.9 ANXIETY: Status: ACTIVE | Noted: 2022-01-31

## 2024-02-12 ENCOUNTER — OFFICE VISIT (OUTPATIENT)
Dept: PRIMARY CARE | Facility: CLINIC | Age: 39
End: 2024-02-12
Payer: COMMERCIAL

## 2024-02-12 ENCOUNTER — LAB (OUTPATIENT)
Dept: LAB | Facility: LAB | Age: 39
End: 2024-02-12
Payer: COMMERCIAL

## 2024-02-12 VITALS
DIASTOLIC BLOOD PRESSURE: 68 MMHG | BODY MASS INDEX: 46.6 KG/M2 | HEIGHT: 63 IN | SYSTOLIC BLOOD PRESSURE: 116 MMHG | RESPIRATION RATE: 16 BRPM | WEIGHT: 263 LBS | TEMPERATURE: 97.7 F | OXYGEN SATURATION: 99 % | HEART RATE: 76 BPM

## 2024-02-12 DIAGNOSIS — N18.9 ANEMIA DUE TO CHRONIC KIDNEY DISEASE, UNSPECIFIED CKD STAGE: ICD-10-CM

## 2024-02-12 DIAGNOSIS — F41.9 ANXIETY AND DEPRESSION: ICD-10-CM

## 2024-02-12 DIAGNOSIS — K58.9 IRRITABLE BOWEL SYNDROME, UNSPECIFIED TYPE: ICD-10-CM

## 2024-02-12 DIAGNOSIS — J45.20 MILD INTERMITTENT ASTHMA WITHOUT COMPLICATION (HHS-HCC): ICD-10-CM

## 2024-02-12 DIAGNOSIS — Z90.3 HISTORY OF SLEEVE GASTRECTOMY: ICD-10-CM

## 2024-02-12 DIAGNOSIS — G47.33 OBSTRUCTIVE SLEEP APNEA SYNDROME: ICD-10-CM

## 2024-02-12 DIAGNOSIS — E53.8 VITAMIN B 12 DEFICIENCY: ICD-10-CM

## 2024-02-12 DIAGNOSIS — Z23 NEED FOR VACCINATION: ICD-10-CM

## 2024-02-12 DIAGNOSIS — K21.9 GASTROESOPHAGEAL REFLUX DISEASE WITHOUT ESOPHAGITIS: ICD-10-CM

## 2024-02-12 DIAGNOSIS — F32.A ANXIETY AND DEPRESSION: ICD-10-CM

## 2024-02-12 DIAGNOSIS — R60.0 LEG EDEMA, RIGHT: ICD-10-CM

## 2024-02-12 DIAGNOSIS — D63.1 ANEMIA DUE TO CHRONIC KIDNEY DISEASE, UNSPECIFIED CKD STAGE: ICD-10-CM

## 2024-02-12 DIAGNOSIS — R60.0 LEG EDEMA, RIGHT: Primary | ICD-10-CM

## 2024-02-12 LAB
ALBUMIN SERPL BCP-MCNC: 3.8 G/DL (ref 3.4–5)
ALP SERPL-CCNC: 73 U/L (ref 33–110)
ALT SERPL W P-5'-P-CCNC: 8 U/L (ref 7–45)
ANION GAP SERPL CALC-SCNC: <7 MMOL/L (ref 10–20)
AST SERPL W P-5'-P-CCNC: 13 U/L (ref 9–39)
BASOPHILS # BLD AUTO: 0.04 X10*3/UL (ref 0–0.1)
BASOPHILS NFR BLD AUTO: 0.8 %
BILIRUB SERPL-MCNC: 0.3 MG/DL (ref 0–1.2)
BUN SERPL-MCNC: 11 MG/DL (ref 6–23)
CALCIUM SERPL-MCNC: 8.6 MG/DL (ref 8.6–10.3)
CHLORIDE SERPL-SCNC: 108 MMOL/L (ref 98–107)
CHOLEST SERPL-MCNC: 158 MG/DL (ref 0–199)
CHOLESTEROL/HDL RATIO: 3.6
CO2 SERPL-SCNC: 27 MMOL/L (ref 21–32)
CREAT SERPL-MCNC: 0.54 MG/DL (ref 0.5–1.05)
EGFRCR SERPLBLD CKD-EPI 2021: >90 ML/MIN/1.73M*2
EOSINOPHIL # BLD AUTO: 0.05 X10*3/UL (ref 0–0.7)
EOSINOPHIL NFR BLD AUTO: 1 %
ERYTHROCYTE [DISTWIDTH] IN BLOOD BY AUTOMATED COUNT: 14.9 % (ref 11.5–14.5)
EST. AVERAGE GLUCOSE BLD GHB EST-MCNC: 103 MG/DL
FERRITIN SERPL-MCNC: 33 NG/ML (ref 8–150)
GLUCOSE SERPL-MCNC: 93 MG/DL (ref 74–99)
HBA1C MFR BLD: 5.2 %
HCT VFR BLD AUTO: 30 % (ref 36–46)
HDLC SERPL-MCNC: 44.5 MG/DL
HGB BLD-MCNC: 8.9 G/DL (ref 12–16)
IMM GRANULOCYTES # BLD AUTO: 0.01 X10*3/UL (ref 0–0.7)
IMM GRANULOCYTES NFR BLD AUTO: 0.2 % (ref 0–0.9)
LDLC SERPL CALC-MCNC: 94 MG/DL
LYMPHOCYTES # BLD AUTO: 1.58 X10*3/UL (ref 1.2–4.8)
LYMPHOCYTES NFR BLD AUTO: 32.8 %
MCH RBC QN AUTO: 24.2 PG (ref 26–34)
MCHC RBC AUTO-ENTMCNC: 29.7 G/DL (ref 32–36)
MCV RBC AUTO: 82 FL (ref 80–100)
MONOCYTES # BLD AUTO: 0.49 X10*3/UL (ref 0.1–1)
MONOCYTES NFR BLD AUTO: 10.2 %
NEUTROPHILS # BLD AUTO: 2.64 X10*3/UL (ref 1.2–7.7)
NEUTROPHILS NFR BLD AUTO: 55 %
NON HDL CHOLESTEROL: 114 MG/DL (ref 0–149)
NRBC BLD-RTO: 0 /100 WBCS (ref 0–0)
PLATELET # BLD AUTO: 314 X10*3/UL (ref 150–450)
POTASSIUM SERPL-SCNC: 3.9 MMOL/L (ref 3.5–5.3)
PROT SERPL-MCNC: 6.5 G/DL (ref 6.4–8.2)
RBC # BLD AUTO: 3.68 X10*6/UL (ref 4–5.2)
SODIUM SERPL-SCNC: 137 MMOL/L (ref 136–145)
T4 FREE SERPL-MCNC: 0.91 NG/DL (ref 0.61–1.12)
TRIGL SERPL-MCNC: 98 MG/DL (ref 0–149)
TSH SERPL-ACNC: 2.56 MIU/L (ref 0.44–3.98)
VIT B12 SERPL-MCNC: >7500 PG/ML (ref 211–911)
VLDL: 20 MG/DL (ref 0–40)
WBC # BLD AUTO: 4.8 X10*3/UL (ref 4.4–11.3)

## 2024-02-12 PROCEDURE — 80053 COMPREHEN METABOLIC PANEL: CPT

## 2024-02-12 PROCEDURE — 84425 ASSAY OF VITAMIN B-1: CPT

## 2024-02-12 PROCEDURE — 84630 ASSAY OF ZINC: CPT

## 2024-02-12 PROCEDURE — 82652 VIT D 1 25-DIHYDROXY: CPT

## 2024-02-12 PROCEDURE — 85025 COMPLETE CBC W/AUTO DIFF WBC: CPT

## 2024-02-12 PROCEDURE — 82728 ASSAY OF FERRITIN: CPT

## 2024-02-12 PROCEDURE — 1036F TOBACCO NON-USER: CPT | Performed by: FAMILY MEDICINE

## 2024-02-12 PROCEDURE — 83036 HEMOGLOBIN GLYCOSYLATED A1C: CPT

## 2024-02-12 PROCEDURE — 99214 OFFICE O/P EST MOD 30 MIN: CPT | Performed by: FAMILY MEDICINE

## 2024-02-12 PROCEDURE — 90480 ADMN SARSCOV2 VAC 1/ONLY CMP: CPT | Performed by: FAMILY MEDICINE

## 2024-02-12 PROCEDURE — 80061 LIPID PANEL: CPT

## 2024-02-12 PROCEDURE — 36415 COLL VENOUS BLD VENIPUNCTURE: CPT

## 2024-02-12 PROCEDURE — 84443 ASSAY THYROID STIM HORMONE: CPT

## 2024-02-12 PROCEDURE — 82607 VITAMIN B-12: CPT

## 2024-02-12 PROCEDURE — 84439 ASSAY OF FREE THYROXINE: CPT

## 2024-02-12 PROCEDURE — 91322 SARSCOV2 VAC 50 MCG/0.5ML IM: CPT | Performed by: FAMILY MEDICINE

## 2024-02-12 PROCEDURE — 96372 THER/PROPH/DIAG INJ SC/IM: CPT | Performed by: FAMILY MEDICINE

## 2024-02-12 RX ORDER — SEMAGLUTIDE 0.25 MG/.5ML
0.25 INJECTION, SOLUTION SUBCUTANEOUS
Qty: 2 ML | Refills: 2 | Status: SHIPPED | OUTPATIENT
Start: 2024-02-12 | End: 2024-03-05

## 2024-02-12 RX ORDER — SERTRALINE HYDROCHLORIDE 100 MG/1
100 TABLET, FILM COATED ORAL DAILY
Qty: 90 TABLET | Refills: 3 | Status: SHIPPED | OUTPATIENT
Start: 2024-02-12 | End: 2024-06-06 | Stop reason: SDUPTHER

## 2024-02-12 RX ORDER — CYANOCOBALAMIN 1000 UG/ML
1000 INJECTION, SOLUTION INTRAMUSCULAR; SUBCUTANEOUS ONCE
Status: COMPLETED | OUTPATIENT
Start: 2024-02-12 | End: 2024-02-12

## 2024-02-12 RX ADMIN — CYANOCOBALAMIN 1000 MCG: 1000 INJECTION, SOLUTION INTRAMUSCULAR; SUBCUTANEOUS at 10:23

## 2024-02-12 NOTE — PROGRESS NOTES
Subjective   Patient ID: Katelynn Avery is a 38 y.o. female who presents for Weight Management (Discuss starting wt loss injections-currently on metformin) and Leg Swelling (Right x 2 months).  Covid vax: UTD  Flu: UTD     Pap: 8/2021  Lmp: 1/16/24        HPI  Patient Active Problem List   Diagnosis    Abdominal obesity and metabolic syndrome    Anemia    Anemia due to chronic kidney disease    Anxiety and depression    Asthma    Sleep apnea    Bariatric surgery status    Chronic pain of right knee    GERD (gastroesophageal reflux disease)    Hearing loss    Hiatal hernia    History of sleeve gastrectomy    Calcium deficiency    Iron deficiency    Irritable bowel syndrome complicating pregnancy    Pap smear abnormality of cervix with ASCUS favoring benign    Polyphagia    Postoperative malabsorption    Primary osteoarthritis of right knee    Psychological factors affecting morbid obesity (CMS/HCC)    Sensorineural hearing loss (SNHL) of right ear with unrestricted hearing of left ear    Tinnitus of right ear    Vitamin A deficiency    Vitamin D deficiency    Vitamin B 12 deficiency    Vitamin B1 deficiency    Weight gain following gastric bypass surgery    Abdominal bloating    Other constipation    Preoperative cardiovascular examination    Class 3 severe obesity in adult (CMS/HCC)    Seasonal allergies    Anxiety    Irritable bowel syndrome       Past Surgical History:   Procedure Laterality Date    CERVICAL BIOPSY  10/2021    DILATION AND CURETTAGE OF UTERUS  2015    x3-2003/2008/2010/2015    SLEEVE GASTROPLASTY  08/2018    TUBAL LIGATION  2020    WISDOM TOOTH EXTRACTION         Review of Systems  This patient has  NO history of seizures/ CAD or CVA    NO history of recent Covid nor flu symptoms,  NO Fever nor chills,  NO Chest pain, shortness of breath nor paroxysmal nocturnal dyspnea,  NO Nausea, vomiting, nor diarrhea,  NO Hematochezia nor melena,  NO Dysuria, hematuria, nor new incontinence issues  NO new  "severe headaches nor neurological complaints,  NO new issues with anxiety nor depression nor new psychiatric complaints,  NO suicidal nor homicidal ideations.     OBJECTIVE:  /68   Pulse 76   Temp 36.5 °C (97.7 °F) (Temporal)   Resp 16   Ht 1.6 m (5' 3\")   Wt 119 kg (263 lb)   LMP 01/16/2024 (Exact Date)   SpO2 99%   BMI 46.59 kg/m²      General:  alert, oriented, no acute distress.  No obvious skin rashes noted.   No gait disturbance noted.    Mood is pleasant,  no signs of emotional distress.   Not appearing intoxicated or altered.   No voiced delusions,   Normal, appropriate behavior.    HEENT: Normocephalic, atraumatic,   Pupils round, reactive to light  Extraocular motions intact and wnl  Tympanic membranes normal    Neck: no nuchal rigidity  No masses palpable.  No carotid bruits.  No thyromegaly.    Respiratory: Equal breath sounds  No wheezes,    rales,    nor rhonchi  No respiratory distress.    Heart: Regular rate and rhythm, no    murmurs  no rubs/gallops    Abdomen: no masses palpable, nontender, no rebound nor guarding.  obese  Extremities: NO cyanosis noted, no clubbing.   No edema noted.  2+dorsalis pedis pulses.    Normal-not antalgic, steady gait.  (-) homans today per pt yesterday mildly + homans on r    Hospital Outpatient Visit on 01/26/2024   Component Date Value Ref Range Status    Preg Test, Ur 01/26/2024 Negative  Negative Final    Case Report 01/26/2024    Final                    Value:Surgical Pathology                                Case: [id number]                                  Authorizing Provider:  Amrik Woody DO Collected:           01/26/2024 1503              Ordering Location:     [facility]   Received:            01/29/2024 0917              Pathologist:           Dorie Alas MD                                                         Specimens:   A) - STOMACH BODY/CORPUS BIOPSY, R/O H. Pylori                                          "             B) - ESOPHAGUS MID BIOPSY, R/O EOE                                                         FINAL DIAGNOSIS 01/26/2024    Final                    Value:This result contains rich text formatting which cannot be displayed here.      01/26/2024    Final                    Value:This result contains rich text formatting which cannot be displayed here.    Gross Description 01/26/2024    Final                    Value:This result contains rich text formatting which cannot be displayed here.        Assessment/Plan     Problem List Items Addressed This Visit       Anemia due to chronic kidney disease    Anxiety and depression    Relevant Medications    sertraline (Zoloft) 100 mg tablet    Asthma    Sleep apnea    GERD (gastroesophageal reflux disease)    History of sleeve gastrectomy    Vitamin B 12 deficiency    Relevant Medications    cyanocobalamin (Vitamin B-12) injection 1,000 mcg (Start on 2/12/2024 10:45 AM)    Irritable bowel syndrome     Other Visit Diagnoses       Need for vaccination        Relevant Orders    Moderna COVID-19 vaccine, [id number], monovalent, age 12 years and older, (50mcg/0.5mL)            Follow up at next scheduled visit 3-6mo  Reqs wegovy This medications risks, benefits, and alternatives were discussed with patient at length.  If any unwanted side effects occur-discontinue medicine and call the office for discussion.  Will do labs  The patient is aware that results will be forthcoming of ALL planned labs and or tests. If no results are received on my chart or by letter within 1 - 3 weeks, the patient is aware they need to call to obtain results, as this is not usual. Also, if any new conditions arise, or current condition worsens, it is understood that sooner appointment should be made or urgent care/convenient care or emergency room treatment should be sought depending on severity. Otherwise follow up for recheck at regular intervals as we have discussed, at least yearly.      See  me 3-6mo

## 2024-02-13 PROBLEM — G56.01 CARPAL TUNNEL SYNDROME, RIGHT UPPER LIMB: Status: ACTIVE | Noted: 2024-02-13

## 2024-02-14 LAB — 1,25(OH)2D3 SERPL-MCNC: 64.9 PG/ML (ref 19.9–79.3)

## 2024-02-15 LAB — ZINC SERPL-MCNC: 64.8 UG/DL (ref 60–120)

## 2024-02-16 DIAGNOSIS — E61.1 IRON DEFICIENCY: Primary | ICD-10-CM

## 2024-02-16 LAB — VIT B1 PYROPHOSHATE BLD-SCNC: 75 NMOL/L (ref 70–180)

## 2024-02-16 RX ORDER — FERROUS SULFATE 220 (44)/5
44 ELIXIR ORAL DAILY
Qty: 473 ML | Refills: 1 | Status: SHIPPED | OUTPATIENT
Start: 2024-02-16

## 2024-02-21 ENCOUNTER — HOSPITAL ENCOUNTER (OUTPATIENT)
Dept: RADIOLOGY | Facility: HOSPITAL | Age: 39
Discharge: HOME | End: 2024-02-21
Payer: COMMERCIAL

## 2024-02-21 DIAGNOSIS — R60.0 LEG EDEMA, RIGHT: ICD-10-CM

## 2024-02-21 PROCEDURE — 93971 EXTREMITY STUDY: CPT

## 2024-02-21 PROCEDURE — 93971 EXTREMITY STUDY: CPT | Performed by: RADIOLOGY

## 2024-03-04 RX ORDER — TRAMADOL HYDROCHLORIDE 50 MG/1
50 TABLET ORAL EVERY 8 HOURS PRN
Qty: 5 TABLET | Refills: 0 | Status: SHIPPED | OUTPATIENT
Start: 2024-03-04 | End: 2024-03-07

## 2024-03-04 NOTE — DISCHARGE INSTRUCTIONS
HAND SURGERY  Post Operative Instructions   Cheyanne Castillo MD     Dressing  You have a bulky dressing on your hand.      You may remove the dressing from your hand…    - 5 days after your surgery    - Please cover the wound with a Band-Aid (NOT WATERPROOF TYPE) or gauze and change it daily.  Please leave wound open to air when at home. Do not use any ointments, peroxide, betadine, alcohol ect. on your wound.     - After your dressing is off, do not do any strenuous activities with your hand until seen by OT or Dr. Castillo within 2 weeks postoperatively. Please call Dr. Castillo's office if this is not already arranged.    If you experience persistent numbness, tingling or burning sensation in your hand, it may be due to a cast or surgical dressing that is too tight. Keep your hand elevated, and if this does not resolve the problem, call your doctor immediately.    If your dressing gets wet, contact your physician’s office.     Activity  If pain will allow, try to flex and extend the fingers on the operated hand.  The dressing and swelling may cause the fingers to be stiff and this is common.  If the fingers turn blue, purple, or remain numb after the block has worn off, call the office immediately.      Showering  You may shower as soon as you want after surgery. Please cover the dressing with a bag.    You may shower and get the wound wet after you remove your dressing.  You may allow the water to run over the incisions and pat the incision dry. DO NOT let the wound soak in a tub, pool, or dish water. If you are using a band aid to cover your incision after a shower, make sure the incision is completely dry.    Ice & Elevate  The use of ice on your arm/hand after surgery will help with both pain control and swelling.  Continue with icing your arm/hand for the first 48 hours after surgery.  Thereafter, use it on an as needed basis.    Elevate your hand above your heart level as much as possible for the first 48 hours,  even while walking.  This will keep the swelling to a minimum and prevent throbbing and pain. Elevation reduces swelling and minimizes pain. Less swelling is associated with a lower infection rate, fewer wound complications, less post-operative stiffness, and more rapid recovery of function. To keep the swelling down, your hand must be kept above the level of your heart.     One common mistake people make is they will put pillows or blankets under their elbow while sitting or laying down. Please always keep the hand above the elbow and move your fingers as much as possible! Swelling tends to collect in the lowest part of the body so if your hand is below the rest of your arm, your fingers and hand will swell and become stiff.    Pain Medication  You will be discharged from the surgery center to home with an oral pain medication (analgesic).  The majority of patients use antiinflammatories like Motrin or Tylenol with sufficient pain relief but the prescription is available for breakthrough pain not responsive to the antiinflammatory medications. Rest and elevation is still one of the most important factors for pain control.     DO NOT drink alcoholic beverages or smoke while taking the prescription pain medication. DO NOT drive a car or other vehicle or operate any machinery while under the influence of your medication.    It is important NOT TO WAIT until your pain is severe before taking your medication since the medications often take an hour or longer before you will begin to feel some relief. Take the medication as soon as you experience even mild pain the first night after surgery. Usually by the second or third day after surgery your upper extremity will begin to feel better and you will require much less pain medication.    **Prescription refills will only be addressed during normal business hours.  Narcotic refills will not be addressed after hours or on weekends as the physician on call does not have access  to your medical records.**    Side Effects: All pain medications can cause nausea, vomiting, and/or constipation. It is best to take pain medication with food. If these problems become significant, please contact our office. Have a phone number for your pharmacy available.    Diet  Eat light the day of surgery and resume normal diet tomorrow. Increase your fluid intake due to pain medications    Driving  It is not advisable to drive a vehicle while you are on pain medication, due to the possible side effects.  However, once you are off pain medication and you feel that you are able to safely control the vehicle, you may drive.    Warning Signs  Fever: A low-grade fever (less than 101 degrees) following surgery and lasting for several days is quite common. You may even have some slight chills and sweating. If you have a low-grade fever you should make an effort to cough and breathe deeply to clear congestion from your lungs.     After hospital discharge, call your physician if you experience:  Increasing or foul smelling drainage (after the first 24 hours)     If any of the above occurs, please notify Dr. Castillo's office.      Follow-up Appointments    We are interested in your prompt and complete recovery from surgery. If you have any problems or questions concerning your recovery, please call your doctor’s office.  Your doctor’s office can be called Monday --Friday, 8:00 am to 5:00 pm.     You should already have an appointment to see Dr. Castillo within the next few weeks.  If you do not have this appointment, or need to change your appointment time, please contact our office.     Do not hesitate to call with any questions or concerns.      Dr. Castillo's office number is:     8AM to 5PM Monday through Friday, please call: [phone]  Nerve Blocks    Why is this procedure done?  Nerve blocks can help to manage pain. By giving you a drug into an exact group of nerves, your doctor may be able to block pain to a  specific part of your body. Some nerve blocks are used after surgery, especially if you have had an abdominal surgery. Nerve blocks are used before surgery to lessen the need for opioid drugs during and after surgery.  There are a few kinds of nerve blocks. Some nerve blocks are used to:  Treat pain. These may have a pain drug and a drug to help with swelling.  Find where your pain is coming from. This kind of nerve block will have a pain drug that lasts for only a certain amount of time.  See if another kind of treatment like surgery will help your pain.  Prevent pain during or after a procedure.  Help you avoid surgery.  Give relief of pain during and after surgery.  Lessen the use of opioid drugs needed for pain after surgery.  Block the pain in an area of the body during surgery as anesthesia.  What will the results be?  The nerve block may help to treat or ease your pain. The area may be numb. You may have some pain relief right away. You may be able to use fewer pain medicines after surgery. It also may be easier for you to move around after surgery. Some nerve blocks go away within a few hours. Others give you pain relief for a day or so to a few months or longer. Some nerve blocks can take a few days to work fully.  What happens before the procedure?  Your doctor will ask you about your health history. Talk to the doctor about:  All the drugs you are taking. Be sure to include all prescription, over the counter, and herbal supplements. Tell the doctor if you have any drug allergy. Bring a list of drugs you take with you.  Any bleeding problems. Be sure to tell your doctor if you are taking any drugs that may cause bleeding. Some of these are warfarin, rivaroxaban, apixaban, ticagrelor, clopidogrel, ibuprofen, naproxen, or aspirin. Certain vitamins and herbs, such as garlic and fish oil, may also add to the risk for bleeding. You may need to stop these drugs as well. Talk to your doctor about them.  What  happens during the procedure?  Sometimes, the doctor will give you a special drug to make you sleepy for the nerve block. Other times, you are completely awake. You may also have a nerve block as a part of your surgery.  The doctor will position you in a way to give them easy access to where you will be having the nerve block.  The doctor will clean the area and give you a local numbing drug. The doctor will use a long thin needle to give you the nerve block. Often, the doctor will use a special x-ray, ultrasound, or CT scan to make sure the needle is in the right place. The doctor will inject the drug close to the nerve that is causing your pain. Sometimes they inject the drug in an area that will block pain from a few nerves.  The doctor will take out the needle and place a clean bandage on your skin.  Sometimes, the doctor may leave a catheter in place to deliver medicine over 1 to 2 days. You may have to return to your doctor's office to have the catheter removed.  The procedure takes 15 to 30 minutes.  What happens after the procedure?  If you are not having surgery, you will be able to go home the same day. You may be asked to rest for 15 to 30 minutes. If the doctor gives you a special drug to make you sleepy for the procedure, you will need someone to drive you home.  What care is needed at home?  You will be allowed to shower or take a bath later that same day unless you have a catheter still in place.  You may need other medicines to help with pain as your nerve block wears off.  What follow-up care is needed?  As your body absorbs the drugs, your pain may come back. Talk to your doctor about if you need another nerve block and how often you can have a nerve block.  What problems could happen?  Infection  Bleeding or bruising  Pain at the injection site  Raised blood sugar  Rash  Itching  Numbness  Nerve injury  Allergic reaction  Puncture or laceration of an organ like the liver, spleen. or bowel  Numbing  medicine injected into a blood vessel  Where can I learn more?  American Society of Regional Anesthesia  https://www.thalia.com/patient-information/regional-anesthesia  NHS  https://www.nhs.uk/conditions/epidural/  NHS  https://www.nhs.uk/conditions/local-anaesthesia/  [employer] Society of [place]  http://www.radiologyinfo.org/en/info.cfm?pg=nerveblock  Last Reviewed Date  2020-04-22

## 2024-03-07 ENCOUNTER — HOSPITAL ENCOUNTER (OUTPATIENT)
Facility: HOSPITAL | Age: 39
Setting detail: OUTPATIENT SURGERY
Discharge: HOME | End: 2024-03-07
Attending: STUDENT IN AN ORGANIZED HEALTH CARE EDUCATION/TRAINING PROGRAM | Admitting: STUDENT IN AN ORGANIZED HEALTH CARE EDUCATION/TRAINING PROGRAM
Payer: COMMERCIAL

## 2024-03-07 VITALS
OXYGEN SATURATION: 97 % | SYSTOLIC BLOOD PRESSURE: 124 MMHG | RESPIRATION RATE: 18 BRPM | BODY MASS INDEX: 45.78 KG/M2 | HEART RATE: 56 BPM | HEIGHT: 63 IN | TEMPERATURE: 97.5 F | DIASTOLIC BLOOD PRESSURE: 69 MMHG | WEIGHT: 258.38 LBS

## 2024-03-07 DIAGNOSIS — G56.01 CARPAL TUNNEL SYNDROME, RIGHT UPPER LIMB: Primary | ICD-10-CM

## 2024-03-07 PROCEDURE — 64721 CARPAL TUNNEL SURGERY: CPT | Performed by: STUDENT IN AN ORGANIZED HEALTH CARE EDUCATION/TRAINING PROGRAM

## 2024-03-07 PROCEDURE — 3600000008 HC OR TIME - EACH INCREMENTAL 1 MINUTE - PROCEDURE LEVEL THREE: Performed by: STUDENT IN AN ORGANIZED HEALTH CARE EDUCATION/TRAINING PROGRAM

## 2024-03-07 PROCEDURE — 7100000010 HC PHASE TWO TIME - EACH INCREMENTAL 1 MINUTE: Performed by: STUDENT IN AN ORGANIZED HEALTH CARE EDUCATION/TRAINING PROGRAM

## 2024-03-07 PROCEDURE — A4217 STERILE WATER/SALINE, 500 ML: HCPCS | Performed by: STUDENT IN AN ORGANIZED HEALTH CARE EDUCATION/TRAINING PROGRAM

## 2024-03-07 PROCEDURE — 2500000005 HC RX 250 GENERAL PHARMACY W/O HCPCS: Performed by: STUDENT IN AN ORGANIZED HEALTH CARE EDUCATION/TRAINING PROGRAM

## 2024-03-07 PROCEDURE — 7100000009 HC PHASE TWO TIME - INITIAL BASE CHARGE: Performed by: STUDENT IN AN ORGANIZED HEALTH CARE EDUCATION/TRAINING PROGRAM

## 2024-03-07 PROCEDURE — 64721 CARPAL TUNNEL SURGERY: CPT

## 2024-03-07 PROCEDURE — 2500000004 HC RX 250 GENERAL PHARMACY W/ HCPCS (ALT 636 FOR OP/ED): Performed by: STUDENT IN AN ORGANIZED HEALTH CARE EDUCATION/TRAINING PROGRAM

## 2024-03-07 PROCEDURE — 3600000003 HC OR TIME - INITIAL BASE CHARGE - PROCEDURE LEVEL THREE: Performed by: STUDENT IN AN ORGANIZED HEALTH CARE EDUCATION/TRAINING PROGRAM

## 2024-03-07 RX ORDER — SODIUM CHLORIDE 0.9 G/100ML
IRRIGANT IRRIGATION AS NEEDED
Status: DISCONTINUED | OUTPATIENT
Start: 2024-03-07 | End: 2024-03-07 | Stop reason: HOSPADM

## 2024-03-07 RX ORDER — LIDOCAINE HYDROCHLORIDE AND EPINEPHRINE 10; 10 MG/ML; UG/ML
20 INJECTION, SOLUTION INFILTRATION; PERINEURAL ONCE
Status: COMPLETED | OUTPATIENT
Start: 2024-03-07 | End: 2024-03-07

## 2024-03-07 RX ADMIN — LIDOCAINE HYDROCHLORIDE,EPINEPHRINE BITARTRATE 20 ML: 10; .01 INJECTION, SOLUTION INFILTRATION; PERINEURAL at 06:08

## 2024-03-07 ASSESSMENT — PAIN - FUNCTIONAL ASSESSMENT
PAIN_FUNCTIONAL_ASSESSMENT: 0-10
PAIN_FUNCTIONAL_ASSESSMENT: 0-10

## 2024-03-07 ASSESSMENT — PAIN SCALES - GENERAL
PAINLEVEL_OUTOF10: 0 - NO PAIN
PAINLEVEL_OUTOF10: 0 - NO PAIN

## 2024-03-07 ASSESSMENT — COLUMBIA-SUICIDE SEVERITY RATING SCALE - C-SSRS
6. HAVE YOU EVER DONE ANYTHING, STARTED TO DO ANYTHING, OR PREPARED TO DO ANYTHING TO END YOUR LIFE?: NO
2. HAVE YOU ACTUALLY HAD ANY THOUGHTS OF KILLING YOURSELF?: NO
1. IN THE PAST MONTH, HAVE YOU WISHED YOU WERE DEAD OR WISHED YOU COULD GO TO SLEEP AND NOT WAKE UP?: NO

## 2024-03-07 NOTE — H&P
History Of Present Illness  Katelynn Avery is a 38 y.o. female presenting for hand surgery.      Past Medical History  She has a past medical history of Anemia, Anxiety, Arthritis, Asthma, Depression, GERD (gastroesophageal reflux disease), Hiatal hernia, Irritable bowel syndrome, Migraines, Multiple personality disorder (CMS/HCC), Pap test, as part of routine gynecological examination (08/2021), Recurrent pregnancy loss, Sleep apnea, and Wears glasses.    Surgical History  She has a past surgical history that includes Dilation and curettage of uterus (2015); Cervical biopsy (10/2021); Sleeve Gastroplasty (08/2018); Tubal ligation (2020); and [address] tooth extraction.     Social History  She reports that she quit smoking about 18 years ago. Her smoking use included cigarettes. She has never used smokeless tobacco. She reports that she does not currently use alcohol. She reports current drug use. Drug: Marijuana.    Family History  Family History   Problem Relation Name Age of Onset    Other (cardiac disorder) Mother      Diabetes Mother      Kidney failure Mother      Obesity Mother          morbid    Other (cardiac disorder) Father      Leukemia Father      Obesity Father          morbid        Allergies  Aspirin and Fish containing products    Denies CP, SOB, N, V, D     RRR  CTAB  Abdomen soft         Relevant Results    Continuous medications    PRN medications        Assessment  Presents for hand surgery   All questions addressed  Will proceed as planned    Cheyanne Escobar MD

## 2024-03-07 NOTE — OP NOTE
CARPAL TUNNEL RELEASE- LOCAL  PREOPERATIVE DIAGNOSIS:   RIGHT Carpal Tunnel Syndrome  POSTOPERATIVE DIAGNOSIS: right Carpal Tunnel Syndrome  PROCEDURE:                              right Open Carpal Tunnel Release (90738)    SURGEON:                                    SURYA DIAZ MD    ANESTHESIA:                              Local.    COMPLICATIONS:                     None.    Patient Name: Katelynn Avery  MRN: [id number]  Time: 8:37 AM  3/7/2024    Estimated Blood Loss: 0ml    INDICATIONS FOR SURGERY:    The patient has been treated for on-going carpal tunnel syndrome that has failed non-operative treatment. Surgical release was offered. Potential benefits include relief of paraesthesias and preventing progression of nerve dysfunction. Risks include bleeding, infection, nerve injury, tendon injury, persistent numbness & paraesthesias, and pillar pain. The patient understood and consented willingly to surgery. The patient also understood the recovery and rehabilitation process.    DESCRIPTION OF PROCEDURE:  The patient was brought to the operating room and identified by the surgical staff. The operative limb was also confirmed by the surgical staff. The operative site in the hand was injected with a local anesthetic for caitlyn-operative pain control. Next, the median nerve in the wrist was injected with a local anesthestic for post-operative pain control. There was no anesthesiology staff present nor any administration of anesthetics through an IV during the case.  The operative limb was then prepped and draped in standard sterile fashion. A 2cm longitudinal incision was placed in the inter-thenar valley of the hand in line with the third webspace. Diligent hemostasis was maintained with bipolar cautery. The superficial palmar fascia was cut in line with the skin incision. The transverse carpal ligament was identified. Once satisfied that the recurrent motor branch was not traversing the ligament it was  released completely distally to the sentinel pad of fat and proximally past the wrist crease into the forearm. Once satisfied that the carpal tunnel and median nerve was completely decompressed the wound was washed. The incision was closed. A soft dressing was applied. The patient was taken to the recovery in stable condition.  I was present for the entire length of the case.     Cheyanne Escobar MD

## 2024-03-08 ENCOUNTER — APPOINTMENT (OUTPATIENT)
Dept: ORTHOPEDIC SURGERY | Facility: CLINIC | Age: 39
End: 2024-03-08
Payer: COMMERCIAL

## 2024-03-18 NOTE — PROGRESS NOTES
2 weeks S/p RCTR. Doing well. Denies numbness or tingling.     Continued left sided sxs.  Considerable improvement on the right.  Interested in a left carpal tunnel release    Physical Examination:  The patient appears to be their stated age, is in no apparent distress, and is oriented x3. The patients mood and affect are appropriate. The patients gait is normal. The examination of the limb in question was performed in comparison to the contralateral limb.    Incision c/d/I  AIN, PIN, ulnar intact  SILT A/R/U/M  Hand wwp    Assessment:  2 weeks post op    Plan:   The patient will progress to weightbearing to tolerance with theupper extremity.  We reviewed range of motion recovery exercises to the digits and wrist, scar massage and desensitization techniques.  The patient will work on these on her own with home exercise program.    We did offer a formal therapy referral.  They declined in favor of home exercise program.   The patient verbalized agreement and understanding of plan for care.  All questions were answered at today's visit.          Plan for left-sided carpal tunnel release in the future.  She will schedule today.    Cheyanne Castillo MD

## 2024-03-19 ENCOUNTER — OFFICE VISIT (OUTPATIENT)
Dept: ORTHOPEDIC SURGERY | Facility: CLINIC | Age: 39
End: 2024-03-19
Payer: COMMERCIAL

## 2024-03-19 DIAGNOSIS — G56.01 CARPAL TUNNEL SYNDROME OF RIGHT WRIST: Primary | ICD-10-CM

## 2024-03-19 PROBLEM — G56.02 CARPAL TUNNEL SYNDROME, LEFT UPPER LIMB: Status: ACTIVE | Noted: 2024-03-19

## 2024-03-19 PROCEDURE — 1036F TOBACCO NON-USER: CPT | Performed by: STUDENT IN AN ORGANIZED HEALTH CARE EDUCATION/TRAINING PROGRAM

## 2024-03-19 PROCEDURE — 99024 POSTOP FOLLOW-UP VISIT: CPT | Performed by: STUDENT IN AN ORGANIZED HEALTH CARE EDUCATION/TRAINING PROGRAM

## 2024-03-19 RX ORDER — SILVER SULFADIAZINE 10 G/1000G
CREAM TOPICAL DAILY
Qty: 50 G | Refills: 1 | Status: SHIPPED | OUTPATIENT
Start: 2024-03-19

## 2024-03-19 NOTE — LETTER
March 19, 2024     Patient: Katelynn Avery   YOB: 1985   Date of Visit: 3/19/2024       To Whom It May Concern:    It is my medical opinion that Katelynn Avery  was seen in my office 3/19/2024 , please excuse her absence to attend this appointment , she has no restrictions to return to work .    If you have any questions or concerns, please don't hesitate to call.         Sincerely,        Cheyanne Escobar MD

## 2024-04-16 RX ORDER — TRAMADOL HYDROCHLORIDE 50 MG/1
50 TABLET ORAL EVERY 8 HOURS PRN
Qty: 5 TABLET | Refills: 0 | Status: SHIPPED | OUTPATIENT
Start: 2024-04-16 | End: 2024-04-19

## 2024-04-16 NOTE — PREPROCEDURE INSTRUCTIONS
NPO Instructions: NO RESTRICTIONS; LOCAL ANESTHESIA PROCEDURE     Additional Instructions:

## 2024-04-16 NOTE — DISCHARGE INSTRUCTIONS
HAND SURGERY  Post Operative Instructions   Cheyanne Castillo MD     Dressing  You have a bulky dressing on your hand.      You may remove the dressing from your hand…    - 5 days after your surgery    - Please cover the wound with a Band-Aid (NOT WATERPROOF TYPE) or gauze and change it daily.  Please leave wound open to air when at home. Do not use any ointments, peroxide, betadine, alcohol ect. on your wound.     - After your dressing is off, do not do any strenuous activities with your hand until seen by OT or Dr. Castillo within 2 weeks postoperatively. Please call Dr. Castillo's office if this is not already arranged.    If you experience persistent numbness, tingling or burning sensation in your hand, it may be due to a cast or surgical dressing that is too tight. Keep your hand elevated, and if this does not resolve the problem, call your doctor immediately.    If your dressing gets wet, contact your physician’s office.     Activity  If pain will allow, try to flex and extend the fingers on the operated hand.  The dressing and swelling may cause the fingers to be stiff and this is common.  If the fingers turn blue, purple, or remain numb after the block has worn off, call the office immediately.      Showering  You may shower as soon as you want after surgery. Please cover the dressing with a bag.    You may shower and get the wound wet after you remove your dressing.  You may allow the water to run over the incisions and pat the incision dry. DO NOT let the wound soak in a tub, pool, or dish water. If you are using a band aid to cover your incision after a shower, make sure the incision is completely dry.    Ice & Elevate  The use of ice on your arm/hand after surgery will help with both pain control and swelling.  Continue with icing your arm/hand for the first 48 hours after surgery.  Thereafter, use it on an as needed basis.    Elevate your hand above your heart level as much as possible for the first 48 hours,  even while walking.  This will keep the swelling to a minimum and prevent throbbing and pain. Elevation reduces swelling and minimizes pain. Less swelling is associated with a lower infection rate, fewer wound complications, less post-operative stiffness, and more rapid recovery of function. To keep the swelling down, your hand must be kept above the level of your heart.     One common mistake people make is they will put pillows or blankets under their elbow while sitting or laying down. Please always keep the hand above the elbow and move your fingers as much as possible! Swelling tends to collect in the lowest part of the body so if your hand is below the rest of your arm, your fingers and hand will swell and become stiff.    Pain Medication  You will be discharged from the surgery center to home with an oral pain medication (analgesic).  The majority of patients use antiinflammatories like Motrin or Tylenol with sufficient pain relief but the prescription is available for breakthrough pain not responsive to the antiinflammatory medications. Rest and elevation is still one of the most important factors for pain control.     DO NOT drink alcoholic beverages or smoke while taking the prescription pain medication. DO NOT drive a car or other vehicle or operate any machinery while under the influence of your medication.    It is important NOT TO WAIT until your pain is severe before taking your medication since the medications often take an hour or longer before you will begin to feel some relief. Take the medication as soon as you experience even mild pain the first night after surgery. Usually by the second or third day after surgery your upper extremity will begin to feel better and you will require much less pain medication.    **Prescription refills will only be addressed during normal business hours.  Narcotic refills will not be addressed after hours or on weekends as the physician on call does not have access  to your medical records.**    Side Effects: All pain medications can cause nausea, vomiting, and/or constipation. It is best to take pain medication with food. If these problems become significant, please contact our office. Have a phone number for your pharmacy available.    Diet  Eat light the day of surgery and resume normal diet tomorrow. Increase your fluid intake due to pain medications    Driving  It is not advisable to drive a vehicle while you are on pain medication, due to the possible side effects.  However, once you are off pain medication and you feel that you are able to safely control the vehicle, you may drive.    Warning Signs  Fever: A low-grade fever (less than 101 degrees) following surgery and lasting for several days is quite common. You may even have some slight chills and sweating. If you have a low-grade fever you should make an effort to cough and breathe deeply to clear congestion from your lungs.     After hospital discharge, call your physician if you experience:  Increasing or foul smelling drainage (after the first 24 hours)     If any of the above occurs, please notify Dr. Castillo's office.      Follow-up Appointments    We are interested in your prompt and complete recovery from surgery. If you have any problems or questions concerning your recovery, please call your doctor’s office.  Your doctor’s office can be called Monday --Friday, 8:00 am to 5:00 pm.     You should already have an appointment to see Dr. Castillo within the next few weeks.  If you do not have this appointment, or need to change your appointment time, please contact our office.     Do not hesitate to call with any questions or concerns.      Dr. Castillo's office number is:     8AM to 5PM Monday through Friday, please call: [phone]  Nerve Blocks    Why is this procedure done?  Nerve blocks can help to manage pain. By giving you a drug into an exact group of nerves, your doctor may be able to block pain to a  specific part of your body. Some nerve blocks are used after surgery, especially if you have had an abdominal surgery. Nerve blocks are used before surgery to lessen the need for opioid drugs during and after surgery.  There are a few kinds of nerve blocks. Some nerve blocks are used to:  Treat pain. These may have a pain drug and a drug to help with swelling.  Find where your pain is coming from. This kind of nerve block will have a pain drug that lasts for only a certain amount of time.  See if another kind of treatment like surgery will help your pain.  Prevent pain during or after a procedure.  Help you avoid surgery.  Give relief of pain during and after surgery.  Lessen the use of opioid drugs needed for pain after surgery.  Block the pain in an area of the body during surgery as anesthesia.  What will the results be?  The nerve block may help to treat or ease your pain. The area may be numb. You may have some pain relief right away. You may be able to use fewer pain medicines after surgery. It also may be easier for you to move around after surgery. Some nerve blocks go away within a few hours. Others give you pain relief for a day or so to a few months or longer. Some nerve blocks can take a few days to work fully.  What happens before the procedure?  Your doctor will ask you about your health history. Talk to the doctor about:  All the drugs you are taking. Be sure to include all prescription, over the counter, and herbal supplements. Tell the doctor if you have any drug allergy. Bring a list of drugs you take with you.  Any bleeding problems. Be sure to tell your doctor if you are taking any drugs that may cause bleeding. Some of these are warfarin, rivaroxaban, apixaban, ticagrelor, clopidogrel, ibuprofen, naproxen, or aspirin. Certain vitamins and herbs, such as garlic and fish oil, may also add to the risk for bleeding. You may need to stop these drugs as well. Talk to your doctor about them.  What  happens during the procedure?  Sometimes, the doctor will give you a special drug to make you sleepy for the nerve block. Other times, you are completely awake. You may also have a nerve block as a part of your surgery.  The doctor will position you in a way to give them easy access to where you will be having the nerve block.  The doctor will clean the area and give you a local numbing drug. The doctor will use a long thin needle to give you the nerve block. Often, the doctor will use a special x-ray, ultrasound, or CT scan to make sure the needle is in the right place. The doctor will inject the drug close to the nerve that is causing your pain. Sometimes they inject the drug in an area that will block pain from a few nerves.  The doctor will take out the needle and place a clean bandage on your skin.  Sometimes, the doctor may leave a catheter in place to deliver medicine over 1 to 2 days. You may have to return to your doctor's office to have the catheter removed.  The procedure takes 15 to 30 minutes.  What happens after the procedure?  If you are not having surgery, you will be able to go home the same day. You may be asked to rest for 15 to 30 minutes. If the doctor gives you a special drug to make you sleepy for the procedure, you will need someone to drive you home.  What care is needed at home?  You will be allowed to shower or take a bath later that same day unless you have a catheter still in place.  You may need other medicines to help with pain as your nerve block wears off.  What follow-up care is needed?  As your body absorbs the drugs, your pain may come back. Talk to your doctor about if you need another nerve block and how often you can have a nerve block.  What problems could happen?  Infection  Bleeding or bruising  Pain at the injection site  Raised blood sugar  Rash  Itching  Numbness  Nerve injury  Allergic reaction  Puncture or laceration of an organ like the liver, spleen. or bowel  Numbing  medicine injected into a blood vessel  Where can I learn more?  American Society of Regional Anesthesia  https://www.thalia.com/patient-information/regional-anesthesia  NHS  https://www.nhs.uk/conditions/epidural/  [facility]HS  https://www.nhs.uk/conditions/local-anaesthesia/  [employer] of [place]  http://www.radiologyinfo.org/en/info.cfm?pg=nerveblock  Last Reviewed Date  2020-04-22

## 2024-04-18 ENCOUNTER — HOSPITAL ENCOUNTER (OUTPATIENT)
Facility: HOSPITAL | Age: 39
Setting detail: OUTPATIENT SURGERY
Discharge: HOME | End: 2024-04-18
Attending: STUDENT IN AN ORGANIZED HEALTH CARE EDUCATION/TRAINING PROGRAM | Admitting: STUDENT IN AN ORGANIZED HEALTH CARE EDUCATION/TRAINING PROGRAM
Payer: COMMERCIAL

## 2024-04-18 VITALS
WEIGHT: 261.02 LBS | SYSTOLIC BLOOD PRESSURE: 123 MMHG | RESPIRATION RATE: 16 BRPM | DIASTOLIC BLOOD PRESSURE: 69 MMHG | BODY MASS INDEX: 46.25 KG/M2 | HEIGHT: 63 IN | OXYGEN SATURATION: 97 % | HEART RATE: 80 BPM | TEMPERATURE: 97 F

## 2024-04-18 DIAGNOSIS — G56.02 CARPAL TUNNEL SYNDROME, LEFT UPPER LIMB: Primary | ICD-10-CM

## 2024-04-18 LAB — PREGNANCY TEST URINE, POC: NEGATIVE

## 2024-04-18 PROCEDURE — 7100000009 HC PHASE TWO TIME - INITIAL BASE CHARGE: Performed by: STUDENT IN AN ORGANIZED HEALTH CARE EDUCATION/TRAINING PROGRAM

## 2024-04-18 PROCEDURE — 3600000003 HC OR TIME - INITIAL BASE CHARGE - PROCEDURE LEVEL THREE: Performed by: STUDENT IN AN ORGANIZED HEALTH CARE EDUCATION/TRAINING PROGRAM

## 2024-04-18 PROCEDURE — A4217 STERILE WATER/SALINE, 500 ML: HCPCS | Performed by: STUDENT IN AN ORGANIZED HEALTH CARE EDUCATION/TRAINING PROGRAM

## 2024-04-18 PROCEDURE — 2500000004 HC RX 250 GENERAL PHARMACY W/ HCPCS (ALT 636 FOR OP/ED): Performed by: STUDENT IN AN ORGANIZED HEALTH CARE EDUCATION/TRAINING PROGRAM

## 2024-04-18 PROCEDURE — 7100000010 HC PHASE TWO TIME - EACH INCREMENTAL 1 MINUTE: Performed by: STUDENT IN AN ORGANIZED HEALTH CARE EDUCATION/TRAINING PROGRAM

## 2024-04-18 PROCEDURE — 81025 URINE PREGNANCY TEST: CPT | Performed by: STUDENT IN AN ORGANIZED HEALTH CARE EDUCATION/TRAINING PROGRAM

## 2024-04-18 PROCEDURE — 2500000005 HC RX 250 GENERAL PHARMACY W/O HCPCS: Performed by: STUDENT IN AN ORGANIZED HEALTH CARE EDUCATION/TRAINING PROGRAM

## 2024-04-18 PROCEDURE — 64721 CARPAL TUNNEL SURGERY: CPT | Performed by: PHYSICIAN ASSISTANT

## 2024-04-18 PROCEDURE — 3600000008 HC OR TIME - EACH INCREMENTAL 1 MINUTE - PROCEDURE LEVEL THREE: Performed by: STUDENT IN AN ORGANIZED HEALTH CARE EDUCATION/TRAINING PROGRAM

## 2024-04-18 PROCEDURE — 64721 CARPAL TUNNEL SURGERY: CPT | Performed by: STUDENT IN AN ORGANIZED HEALTH CARE EDUCATION/TRAINING PROGRAM

## 2024-04-18 RX ORDER — LIDOCAINE HYDROCHLORIDE AND EPINEPHRINE 10; 10 MG/ML; UG/ML
20 INJECTION, SOLUTION INFILTRATION; PERINEURAL ONCE
Status: COMPLETED | OUTPATIENT
Start: 2024-04-18 | End: 2024-04-18

## 2024-04-18 RX ORDER — SODIUM CHLORIDE 0.9 G/100ML
IRRIGANT IRRIGATION AS NEEDED
Status: DISCONTINUED | OUTPATIENT
Start: 2024-04-18 | End: 2024-04-18 | Stop reason: HOSPADM

## 2024-04-18 RX ADMIN — LIDOCAINE HYDROCHLORIDE,EPINEPHRINE BITARTRATE 20 ML: 10; .01 INJECTION, SOLUTION INFILTRATION; PERINEURAL at 08:19

## 2024-04-18 ASSESSMENT — PAIN SCALES - GENERAL
PAINLEVEL_OUTOF10: 0 - NO PAIN
PAINLEVEL_OUTOF10: 0 - NO PAIN

## 2024-04-18 ASSESSMENT — COLUMBIA-SUICIDE SEVERITY RATING SCALE - C-SSRS
2. HAVE YOU ACTUALLY HAD ANY THOUGHTS OF KILLING YOURSELF?: NO
1. IN THE PAST MONTH, HAVE YOU WISHED YOU WERE DEAD OR WISHED YOU COULD GO TO SLEEP AND NOT WAKE UP?: NO
6. HAVE YOU EVER DONE ANYTHING, STARTED TO DO ANYTHING, OR PREPARED TO DO ANYTHING TO END YOUR LIFE?: NO

## 2024-04-18 ASSESSMENT — PAIN - FUNCTIONAL ASSESSMENT
PAIN_FUNCTIONAL_ASSESSMENT: 0-10
PAIN_FUNCTIONAL_ASSESSMENT: 0-10

## 2024-04-18 NOTE — OP NOTE
CARPAL TUNNEL RELEASE- LOCAL  PREOPERATIVE DIAGNOSIS:   LEFT Carpal Tunnel Syndrome  POSTOPERATIVE DIAGNOSIS: LEFT Carpal Tunnel Syndrome  PROCEDURE:                              LEFT Open Carpal Tunnel Release (67339)    SURGEON:                                    SURYA DIAZ MD    ANESTHESIA:                              Local.    COMPLICATIONS:                     None.    Patient Name: Katelynn Avery  MRN: [id number]  Time: 9:21 AM  4/18/2024    Estimated Blood Loss: 0ml    INDICATIONS FOR SURGERY:    The patient has been treated for on-going carpal tunnel syndrome that has failed non-operative treatment. Surgical release was offered. Potential benefits include relief of paraesthesias and preventing progression of nerve dysfunction. Risks include bleeding, infection, nerve injury, tendon injury, persistent numbness & paraesthesias, and pillar pain. The patient understood and consented willingly to surgery. The patient also understood the recovery and rehabilitation process.    DESCRIPTION OF PROCEDURE:  The patient was brought to the operating room and identified by the surgical staff. The operative limb was also confirmed by the surgical staff. The operative site in the hand was injected with a local anesthetic for caitlyn-operative pain control. Next, the median nerve in the wrist was injected with a local anesthestic for post-operative pain control. There was no anesthesiology staff present nor any administration of anesthetics through an IV during the case.  The operative limb was then prepped and draped in standard sterile fashion. A 2cm longitudinal incision was placed in the inter-thenar valley of the hand in line with the third webspace. Diligent hemostasis was maintained with bipolar cautery. The superficial palmar fascia was cut in line with the skin incision. The transverse carpal ligament was identified. Once satisfied that the recurrent motor branch was not traversing the ligament it was released  completely distally to the sentinel pad of fat and proximally past the wrist crease into the forearm. Once satisfied that the carpal tunnel and median nerve was completely decompressed the wound was washed. The incision was closed. A soft dressing was applied. The patient was taken to the recovery in stable condition.  I was present for the entire length of the case.     Asaf Lutz PA-C was present throughout the entire case. Given the nature of the disease process and the procedure, a skilled surgical first assistant was necessary during the case. The assistant was necessary to hold retractors and to manipulate the extremity during the procedure. A certified scrub tech was at the back table managing the instruments and supplies for the surgical case.      Cheyanne Escobar MD

## 2024-04-18 NOTE — H&P
History Of Present Illness  Katelynn Avery is a 38 y.o. female presenting for hand surgery.      Past Medical History  She has a past medical history of Anemia, Anxiety, Arthritis, Asthma ([facility]), COVID-19, Depression, GERD (gastroesophageal reflux disease), Hiatal hernia, Irritable bowel syndrome, Migraines, Multiple personality disorder (Multi), Pap test, as part of routine gynecological examination (08/2021), Recurrent pregnancy loss, Sleep apnea, and Wears glasses.    Surgical History  She has a past surgical history that includes Dilation and curettage of uterus (2015); Cervical biopsy (10/2021); Sleeve Gastroplasty (08/2018); Tubal ligation (2020); [place] tooth extraction; and Carpal tunnel release.     Social History  She reports that she quit smoking about 18 years ago. Her smoking use included cigarettes. She has never used smokeless tobacco. She reports that she does not currently use alcohol. She reports current drug use. Drug: Marijuana.    Family History  Family History   Problem Relation Name Age of Onset    Other (cardiac disorder) Mother      Diabetes Mother      Kidney failure Mother      Obesity Mother          morbid    Other (cardiac disorder) Father      Leukemia Father      Obesity Father          morbid        Allergies  Aspirin and Fish containing products    Denies CP, SOB, N, V, D     RRR  CTAB  Abdomen soft         Relevant Results    Continuous medications    PRN medications        Assessment  Presents for hand surgery   All questions addressed  Will proceed as planned    Cheyanne Escobar MD

## 2024-05-03 ENCOUNTER — OFFICE VISIT (OUTPATIENT)
Dept: ORTHOPEDIC SURGERY | Facility: CLINIC | Age: 39
End: 2024-05-03
Payer: COMMERCIAL

## 2024-05-03 DIAGNOSIS — G56.02 CARPAL TUNNEL SYNDROME OF LEFT WRIST: ICD-10-CM

## 2024-05-03 PROCEDURE — 99024 POSTOP FOLLOW-UP VISIT: CPT | Performed by: STUDENT IN AN ORGANIZED HEALTH CARE EDUCATION/TRAINING PROGRAM

## 2024-05-03 NOTE — PROGRESS NOTES
2 weeks S/p LCTR. Doing well. Denies numbness or tingling.     Physical Examination:  The patient appears to be their stated age, is in no apparent distress, and is oriented x3. The patients mood and affect are appropriate. The patients gait is normal. The examination of the limb in question was performed in comparison to the contralateral limb.    Incision c/d/I  AIN, PIN, ulnar intact  SILT A/R/U/M  Hand wwp    Assessment:  2 weeks post op    Plan:   The patient will progress to weightbearing to tolerance with theupper extremity.  We reviewed range of motion recovery exercises to the digits and wrist, scar massage and desensitization techniques.  The patient will work on these on her own with home exercise program.  Follow up with our office on an as-needed basis.  We did offer a formal therapy referral. She would like to do this as she feels she can improve strength. The patient verbalized agreement and understanding of plan for care.  All questions were answered at today's visit.          Cheyanne Castillo MD

## 2024-05-03 NOTE — LETTER
May 3, 2024     Patient: Katelynn Avery   YOB: 1985   Date of Visit: 5/3/2024       To Whom it May Concern:    Katelynn Avery was seen in my clinic on 5/3/2024.     If you have any questions or concerns, please don't hesitate to call.                                                      Cheyanne Escobar MD

## 2024-05-03 NOTE — Clinical Note
May 3, 2024     Patient: Katelynn Avery   YOB: 1985   Date of Visit: 5/3/2024       To Whom It May Concern:    It is my medical opinion that Katelynn Avery {Work release (duty restriction):75350}.    If you have any questions or concerns, please don't hesitate to call.         Sincerely,        Cheyanne Escobar MD    CC: No Recipients

## 2024-06-06 ENCOUNTER — PATIENT MESSAGE (OUTPATIENT)
Dept: PRIMARY CARE | Facility: CLINIC | Age: 39
End: 2024-06-06
Payer: COMMERCIAL

## 2024-06-06 DIAGNOSIS — F32.A ANXIETY AND DEPRESSION: ICD-10-CM

## 2024-06-06 DIAGNOSIS — F41.9 ANXIETY AND DEPRESSION: ICD-10-CM

## 2024-06-06 RX ORDER — SERTRALINE HYDROCHLORIDE 100 MG/1
100 TABLET, FILM COATED ORAL DAILY
Qty: 90 TABLET | Refills: 0 | Status: SHIPPED | OUTPATIENT
Start: 2024-06-06

## 2024-06-06 NOTE — TELEPHONE ENCOUNTER
From: Katelynn Avery  To: Malini Foster  Sent: 6/6/2024 10:15 AM EDT  Subject: Refill    Need a refill on my sertraline hcl 100 mg tablets

## 2024-09-07 DIAGNOSIS — F32.A ANXIETY AND DEPRESSION: ICD-10-CM

## 2024-09-07 DIAGNOSIS — F41.9 ANXIETY AND DEPRESSION: ICD-10-CM

## 2024-09-07 RX ORDER — SERTRALINE HYDROCHLORIDE 100 MG/1
100 TABLET, FILM COATED ORAL DAILY
Qty: 90 TABLET | Refills: 1 | Status: SHIPPED | OUTPATIENT
Start: 2024-09-07

## 2025-04-29 DIAGNOSIS — F32.A ANXIETY AND DEPRESSION: ICD-10-CM

## 2025-04-29 DIAGNOSIS — F41.9 ANXIETY AND DEPRESSION: ICD-10-CM

## 2025-04-30 RX ORDER — SERTRALINE HYDROCHLORIDE 100 MG/1
100 TABLET, FILM COATED ORAL DAILY
Qty: 90 TABLET | Refills: 1 | Status: SHIPPED | OUTPATIENT
Start: 2025-04-30

## 2025-07-17 PROBLEM — Z01.810 PREOPERATIVE CARDIOVASCULAR EXAMINATION: Status: RESOLVED | Noted: 2023-12-29 | Resolved: 2025-07-17

## 2025-07-17 PROBLEM — Z98.84 BARIATRIC SURGERY STATUS: Status: RESOLVED | Noted: 2023-02-10 | Resolved: 2025-07-17

## 2025-07-17 PROBLEM — O99.619: Status: RESOLVED | Noted: 2023-02-10 | Resolved: 2025-07-17

## 2025-07-17 PROBLEM — K58.9: Status: RESOLVED | Noted: 2023-02-10 | Resolved: 2025-07-17

## 2025-07-22 ENCOUNTER — APPOINTMENT (OUTPATIENT)
Dept: PRIMARY CARE | Facility: CLINIC | Age: 40
End: 2025-07-22
Payer: COMMERCIAL

## 2025-07-22 VITALS
BODY MASS INDEX: 46.6 KG/M2 | HEART RATE: 82 BPM | WEIGHT: 263 LBS | SYSTOLIC BLOOD PRESSURE: 116 MMHG | TEMPERATURE: 97.1 F | HEIGHT: 63 IN | RESPIRATION RATE: 16 BRPM | DIASTOLIC BLOOD PRESSURE: 72 MMHG

## 2025-07-22 DIAGNOSIS — K21.9 GASTROESOPHAGEAL REFLUX DISEASE, UNSPECIFIED WHETHER ESOPHAGITIS PRESENT: ICD-10-CM

## 2025-07-22 DIAGNOSIS — N18.9 ANEMIA DUE TO CHRONIC KIDNEY DISEASE, UNSPECIFIED CKD STAGE: ICD-10-CM

## 2025-07-22 DIAGNOSIS — R87.612 LGSIL OF CERVIX OF UNDETERMINED SIGNIFICANCE: ICD-10-CM

## 2025-07-22 DIAGNOSIS — F41.9 ANXIETY: ICD-10-CM

## 2025-07-22 DIAGNOSIS — D63.1 ANEMIA DUE TO CHRONIC KIDNEY DISEASE, UNSPECIFIED CKD STAGE: ICD-10-CM

## 2025-07-22 DIAGNOSIS — E53.8 VITAMIN B 12 DEFICIENCY: ICD-10-CM

## 2025-07-22 DIAGNOSIS — J45.20 MILD INTERMITTENT ASTHMA WITHOUT COMPLICATION (HHS-HCC): ICD-10-CM

## 2025-07-22 DIAGNOSIS — Z90.3 HISTORY OF SLEEVE GASTRECTOMY: ICD-10-CM

## 2025-07-22 DIAGNOSIS — Z00.00 ANNUAL PHYSICAL EXAM: ICD-10-CM

## 2025-07-22 DIAGNOSIS — F41.9 ANXIETY AND DEPRESSION: ICD-10-CM

## 2025-07-22 DIAGNOSIS — Z12.4 CERVICAL CANCER SCREENING: ICD-10-CM

## 2025-07-22 DIAGNOSIS — F32.A ANXIETY AND DEPRESSION: ICD-10-CM

## 2025-07-22 PROBLEM — J45.909 ASTHMA: Status: RESOLVED | Noted: 2023-02-10 | Resolved: 2025-07-22

## 2025-07-22 PROCEDURE — 3008F BODY MASS INDEX DOCD: CPT | Performed by: FAMILY MEDICINE

## 2025-07-22 PROCEDURE — 99395 PREV VISIT EST AGE 18-39: CPT | Performed by: FAMILY MEDICINE

## 2025-07-22 PROCEDURE — 96372 THER/PROPH/DIAG INJ SC/IM: CPT | Performed by: FAMILY MEDICINE

## 2025-07-22 PROCEDURE — 87626 HPV SEP HI-RSK TYP&POOL RSLT: CPT

## 2025-07-22 RX ORDER — CYANOCOBALAMIN 1000 UG/ML
1000 INJECTION, SOLUTION INTRAMUSCULAR; SUBCUTANEOUS ONCE
Status: COMPLETED | OUTPATIENT
Start: 2025-07-22 | End: 2025-07-22

## 2025-07-22 RX ORDER — PANTOPRAZOLE SODIUM 40 MG/1
40 TABLET, DELAYED RELEASE ORAL 2 TIMES DAILY
Qty: 60 TABLET | Refills: 5 | Status: SHIPPED | OUTPATIENT
Start: 2025-07-22 | End: 2026-07-22

## 2025-07-22 RX ORDER — METFORMIN HYDROCHLORIDE 500 MG/1
500 TABLET ORAL
Qty: 100 TABLET | Refills: 3 | Status: SHIPPED | OUTPATIENT
Start: 2025-07-22 | End: 2026-08-26

## 2025-07-22 RX ADMIN — CYANOCOBALAMIN 1000 MCG: 1000 INJECTION, SOLUTION INTRAMUSCULAR; SUBCUTANEOUS at 09:45

## 2025-07-22 ASSESSMENT — PATIENT HEALTH QUESTIONNAIRE - PHQ9
7. TROUBLE CONCENTRATING ON THINGS, SUCH AS READING THE NEWSPAPER OR WATCHING TELEVISION: SEVERAL DAYS
4. FEELING TIRED OR HAVING LITTLE ENERGY: MORE THAN HALF THE DAYS
SUM OF ALL RESPONSES TO PHQ QUESTIONS 1-9: 8
5. POOR APPETITE OR OVEREATING: NOT AT ALL
SUM OF ALL RESPONSES TO PHQ9 QUESTIONS 1 AND 2: 2
6. FEELING BAD ABOUT YOURSELF - OR THAT YOU ARE A FAILURE OR HAVE LET YOURSELF OR YOUR FAMILY DOWN: MORE THAN HALF THE DAYS
9. THOUGHTS THAT YOU WOULD BE BETTER OFF DEAD, OR OF HURTING YOURSELF: NOT AT ALL
1. LITTLE INTEREST OR PLEASURE IN DOING THINGS: SEVERAL DAYS
8. MOVING OR SPEAKING SO SLOWLY THAT OTHER PEOPLE COULD HAVE NOTICED. OR THE OPPOSITE, BEING SO FIGETY OR RESTLESS THAT YOU HAVE BEEN MOVING AROUND A LOT MORE THAN USUAL: NOT AT ALL
2. FEELING DOWN, DEPRESSED OR HOPELESS: SEVERAL DAYS
3. TROUBLE FALLING OR STAYING ASLEEP OR SLEEPING TOO MUCH: SEVERAL DAYS

## 2025-07-22 NOTE — PROGRESS NOTES
Katelynn Avery 40 y.o. female presents today for pap smear    Chief Complaint   Patient presents with    Annual Exam     Pt would like labs    Gynecologic Exam     Covid vax: x 4  Flu: n/a     Pap: 2021-LGSIL-had bx  Lmp: 1 week ago  Needs yrly labs due to PSHx    Medical History[1]   Surgical History[2]   Allergies[3]   Current Medications[4]   Social History     Tobacco Use    Smoking status: Former     Current packs/day: 0.00     Average packs/day: 0.3 packs/day for 5.0 years (1.3 ttl pk-yrs)     Types: Cigarettes     Quit date:      Years since quittin.5    Smokeless tobacco: Never   Substance Use Topics    Alcohol use: Yes     Comment: rare        The patient reports   No Fever/chills  No Nausea/vomiting/diarrhea  No CVA tenderness  No Confusion   No Paresthesias nor headache today  No Chest pains or shortness of breath           Objective:  Vitals:    25 0822   BP: 116/72   Pulse: 82   Resp: 16   Temp: 36.2 °C (97.1 °F)      General:  alert, oriented, no acute distress.  No obvious skin rashes noted.   No gait disturbance noted.    Mood is pleasant, not tearful, no signs of emotional distress.  Not appearing intoxicated or altered.   No voiced delusions,   Normal, appropriate behavior.    HEENT: Normocephalic, atraumatic,   Pupils round, reactive to light  Extraocular motions intact and wnl      Neck: no nuchal rigidity  No masses palpable.  No carotid bruits.  No thyromegaly.    Respiratory: Equal breath sounds  No wheezes,    rales,    nor rhonchi  No respiratory distress.    Heart: Regular rate and rhythm, no    murmurs  no rubs/gallops    Abdomen: no masses palpable, no rebound nor guarding, no rebound nor guarding.  Ovwt    Extremities: NO cyanosis noted, no clubbing.   No edema noted.  2+dorsalis pedis pulses.    Normal-not antalgic, steady gait.    Breast-No concerning lumps, nipple discharge, concerning skin changes or dimpling, or palpable adenopathy noted on full breast  exam.    Pelvic-No cervical motion tenderness,   No concerning vaginal discharge  No vaginal vault or labial abnormalities noted  Normal perineal appearance  Patient tolerated pap sans difficulty or complaints    Medical assistant present as witness for pelvic exam    Assessment/Plan     Problem List Items Addressed This Visit       Anemia due to chronic kidney disease    Relevant Orders    CBC and Auto Differential    Comprehensive Metabolic Panel    Hemoglobin A1C    Lipid Panel    Vitamin B12    Vitamin D 1,25 Dihydroxy (for eval of hypercalcemia)    Thyroid Stimulating Hormone    Thyroxine, Free    Ferritin    Vitamin B1, Whole Blood    Zinc, Serum or Plasma    Anxiety and depression    Relevant Orders    CBC and Auto Differential    Comprehensive Metabolic Panel    Hemoglobin A1C    Lipid Panel    Vitamin B12    Vitamin D 1,25 Dihydroxy (for eval of hypercalcemia)    Thyroid Stimulating Hormone    Thyroxine, Free    Ferritin    Vitamin B1, Whole Blood    Zinc, Serum or Plasma    GERD (gastroesophageal reflux disease)    Relevant Medications    pantoprazole (ProtoNix) 40 mg EC tablet    Other Relevant Orders    CBC and Auto Differential    Comprehensive Metabolic Panel    Hemoglobin A1C    Lipid Panel    Vitamin B12    Vitamin D 1,25 Dihydroxy (for eval of hypercalcemia)    Thyroid Stimulating Hormone    Thyroxine, Free    Ferritin    Vitamin B1, Whole Blood    Zinc, Serum or Plasma    History of sleeve gastrectomy    Relevant Orders    CBC and Auto Differential    Comprehensive Metabolic Panel    Hemoglobin A1C    Lipid Panel    Vitamin B12    Vitamin D 1,25 Dihydroxy (for eval of hypercalcemia)    Thyroid Stimulating Hormone    Thyroxine, Free    Ferritin    Vitamin B1, Whole Blood    Zinc, Serum or Plasma    Vitamin B 12 deficiency    Relevant Orders    CBC and Auto Differential    Comprehensive Metabolic Panel    Hemoglobin A1C    Lipid Panel    Vitamin B12    Vitamin D 1,25 Dihydroxy (for eval of  hypercalcemia)    Thyroid Stimulating Hormone    Thyroxine, Free    Ferritin    Vitamin B1, Whole Blood    Zinc, Serum or Plasma    Anxiety    Relevant Orders    CBC and Auto Differential    Comprehensive Metabolic Panel    Hemoglobin A1C    Lipid Panel    Vitamin B12    Vitamin D 1,25 Dihydroxy (for eval of hypercalcemia)    Thyroid Stimulating Hormone    Thyroxine, Free    Ferritin    Vitamin B1, Whole Blood    Zinc, Serum or Plasma     Other Visit Diagnoses         Annual physical exam        Relevant Orders    CBC and Auto Differential    Comprehensive Metabolic Panel    Hemoglobin A1C    Lipid Panel    Vitamin B12    Vitamin D 1,25 Dihydroxy (for eval of hypercalcemia)    Thyroid Stimulating Hormone    Thyroxine, Free    Ferritin    Vitamin B1, Whole Blood    Zinc, Serum or Plasma      Cervical cancer screening        Relevant Orders    CBC and Auto Differential    Comprehensive Metabolic Panel    Hemoglobin A1C    Lipid Panel    Vitamin B12    Vitamin D 1,25 Dihydroxy (for eval of hypercalcemia)    Thyroid Stimulating Hormone    Thyroxine, Free    Ferritin    Vitamin B1, Whole Blood    Zinc, Serum or Plasma      LGSIL of cervix of undetermined significance        Relevant Orders    CBC and Auto Differential    Comprehensive Metabolic Panel    Hemoglobin A1C    Lipid Panel    Vitamin B12    Vitamin D 1,25 Dihydroxy (for eval of hypercalcemia)    Thyroid Stimulating Hormone    Thyroxine, Free    Ferritin    Vitamin B1, Whole Blood    Zinc, Serum or Plasma              Patient is aware to follow up based on pap results for next pap according to age related guidelines and sooner if abnormality detected. For certain abnormalities patient is aware to follow up with Gynecology physician.  Patient should receive pap results within 2-3 weeks and if they are NOT received, she knows to call office to find results as this is not usual course of action.    Katelynn Avery -be aware that any referrals discussed should be  "placed today or tests/labs ordered should result in prompt scheduling today.   If not done today-then a phone call for scheduling is expected in a timely manner(within 2 weeks).   If testing is to be done-a result should be available to patient within 2 weeks time unless otherwise specified.   You, the patient or caregiver, are responsible for making sure what was discussed is actually scheduled and completed.  If suboptimal understanding of results of tests or referral reason-a follow up appointment with me should be made.  If above does NOT occur-you are to connect with us for an explanation.    Follow up at next scheduled visit -as planned or directed today.  Sooner if new or unresolved issues of concern.    Katelynn Avery We know you have a choice for your health care, THANK YOU for choosing  and [facility].  We APPRECIATE YOU.  Sincerely,   Malini Foster MD   (dr. Gonzalez)    Malini Foster MD         [1]   Past Medical History:  Diagnosis Date    Anemia     Anxiety     Arthritis     Asthma     Clotting disorder (Multi) Anemia    COVID-19     VACCINATED    Depression     GERD (gastroesophageal reflux disease)     Hiatal hernia     Irritable bowel syndrome     Migraines     Multiple personality disorder (Multi)     pt reported    Pap test, as part of routine gynecological examination 08/2021    LGSIL, hpv negative-saw gyn for bx    Recurrent pregnancy loss     Sleep apnea     cpap- does not use    Wears glasses    [2]   Past Surgical History:  Procedure Laterality Date    CARPAL TUNNEL RELEASE      RIGHT    CERVICAL BIOPSY  10/2021    DILATION AND CURETTAGE OF UTERUS  2015    x3-2003/2008/2010/2015    SLEEVE GASTROPLASTY  08/2018    TUBAL LIGATION  2020    WISDOM TOOTH EXTRACTION     [3]   Allergies  Allergen Reactions    Aspirin Anaphylaxis     \"Throat swells\"    Fish Containing Products Anaphylaxis     Pt. states NOT ALLERGIC to Iodine   [4]   Current Outpatient Medications   Medication " Sig Dispense Refill    albuterol 2.5 mg /3 mL (0.083 %) nebulizer solution 1 UNIT DOSE AS NEEDED - RARELY      albuterol 90 mcg/actuation inhaler Inhale 2 puffs every 6 hours if needed.      Ca-D3-mag ox-zinc--rosario-bor (Calcium 600-D3 Plus, mag-zinc,) 600 mg calcium- 20 mcg-50 mg tablet Take 1 tablet by mouth in the morning and at bedtime.      cyanocobalamin, vitamin B-12, 1,000 mcg/mL kit Inject 1,000 mcg as directed every 28 (twenty-eight) days.      ferrous sulfate 220 mg (44 mg iron)/5 mL syrup Take 5 mL (44 mg of iron) by mouth once daily. 473 mL 1    Lactobac. rhamnosus GG-inulin 20 billion cell -200 mg capsule Take 1 tablet by mouth in the morning.      sertraline (Zoloft) 100 mg tablet Take 1 tablet (100 mg) by mouth once daily. 90 tablet 1    silver sulfADIAZINE (Silvadene) 1 % cream Apply topically once daily. 50 g 1    pantoprazole (ProtoNix) 40 mg EC tablet Take 1 tablet (40 mg) by mouth 2 times a day. Do not crush, chew, or split. 60 tablet 5    semaglutide, weight loss, (Wegovy) 0.25 mg/0.5 mL pen injector Inject 0.25 mg under the skin 1 (one) time per week for 4 doses. 2 mL 2     No current facility-administered medications for this visit.

## 2025-07-23 ENCOUNTER — HOSPITAL ENCOUNTER (EMERGENCY)
Facility: HOSPITAL | Age: 40
Discharge: HOME | End: 2025-07-23
Attending: STUDENT IN AN ORGANIZED HEALTH CARE EDUCATION/TRAINING PROGRAM
Payer: COMMERCIAL

## 2025-07-23 VITALS
RESPIRATION RATE: 18 BRPM | HEIGHT: 63 IN | SYSTOLIC BLOOD PRESSURE: 169 MMHG | WEIGHT: 264 LBS | HEART RATE: 62 BPM | DIASTOLIC BLOOD PRESSURE: 72 MMHG | BODY MASS INDEX: 46.78 KG/M2 | TEMPERATURE: 97.5 F | OXYGEN SATURATION: 99 %

## 2025-07-23 DIAGNOSIS — D50.0 IRON DEFICIENCY ANEMIA DUE TO CHRONIC BLOOD LOSS: Primary | ICD-10-CM

## 2025-07-23 DIAGNOSIS — D50.8 OTHER IRON DEFICIENCY ANEMIA: Primary | ICD-10-CM

## 2025-07-23 LAB
ABO GROUP (TYPE) IN BLOOD: NORMAL
ALBUMIN SERPL BCP-MCNC: 4 G/DL (ref 3.4–5)
ALP SERPL-CCNC: 68 U/L (ref 33–110)
ALT SERPL W P-5'-P-CCNC: 8 U/L (ref 7–45)
ANION GAP SERPL CALC-SCNC: 12 MMOL/L (ref 10–20)
ANTIBODY SCREEN: NORMAL
APTT PPP: 27 SECONDS (ref 26–36)
AST SERPL W P-5'-P-CCNC: 12 U/L (ref 9–39)
B-HCG SERPL-ACNC: <2 MIU/ML
BASOPHILS # BLD AUTO: 0.04 X10*3/UL (ref 0–0.1)
BASOPHILS NFR BLD AUTO: 0.6 %
BILIRUB SERPL-MCNC: 0.3 MG/DL (ref 0–1.2)
BLOOD EXPIRATION DATE: NORMAL
BLOOD EXPIRATION DATE: NORMAL
BUN SERPL-MCNC: 12 MG/DL (ref 6–23)
CALCIUM SERPL-MCNC: 8.6 MG/DL (ref 8.6–10.3)
CHLORIDE SERPL-SCNC: 106 MMOL/L (ref 98–107)
CO2 SERPL-SCNC: 24 MMOL/L (ref 21–32)
CREAT SERPL-MCNC: 0.56 MG/DL (ref 0.5–1.05)
DISPENSE STATUS: NORMAL
DISPENSE STATUS: NORMAL
EGFRCR SERPLBLD CKD-EPI 2021: >90 ML/MIN/1.73M*2
EOSINOPHIL # BLD AUTO: 0.05 X10*3/UL (ref 0–0.7)
EOSINOPHIL NFR BLD AUTO: 0.8 %
ERYTHROCYTE [DISTWIDTH] IN BLOOD BY AUTOMATED COUNT: 18.2 % (ref 11.5–14.5)
GLUCOSE SERPL-MCNC: 89 MG/DL (ref 74–99)
HCT VFR BLD AUTO: 24.1 % (ref 36–46)
HGB BLD-MCNC: 6.6 G/DL (ref 12–16)
IMM GRANULOCYTES # BLD AUTO: 0.01 X10*3/UL (ref 0–0.7)
IMM GRANULOCYTES NFR BLD AUTO: 0.2 % (ref 0–0.9)
INR PPP: 1.1 (ref 0.9–1.1)
LYMPHOCYTES # BLD AUTO: 1.49 X10*3/UL (ref 1.2–4.8)
LYMPHOCYTES NFR BLD AUTO: 24.1 %
MCH RBC QN AUTO: 18.4 PG (ref 26–34)
MCHC RBC AUTO-ENTMCNC: 27.4 G/DL (ref 32–36)
MCV RBC AUTO: 67 FL (ref 80–100)
MONOCYTES # BLD AUTO: 0.66 X10*3/UL (ref 0.1–1)
MONOCYTES NFR BLD AUTO: 10.7 %
NEUTROPHILS # BLD AUTO: 3.92 X10*3/UL (ref 1.2–7.7)
NEUTROPHILS NFR BLD AUTO: 63.6 %
NRBC BLD-RTO: 0 /100 WBCS (ref 0–0)
PLATELET # BLD AUTO: 269 X10*3/UL (ref 150–450)
POTASSIUM SERPL-SCNC: 4.1 MMOL/L (ref 3.5–5.3)
PRODUCT BLOOD TYPE: 6200
PRODUCT BLOOD TYPE: 6200
PRODUCT CODE: NORMAL
PRODUCT CODE: NORMAL
PROT SERPL-MCNC: 7.3 G/DL (ref 6.4–8.2)
PROTHROMBIN TIME: 12.1 SECONDS (ref 9.8–12.4)
RBC # BLD AUTO: 3.58 X10*6/UL (ref 4–5.2)
RH FACTOR (ANTIGEN D): NORMAL
SODIUM SERPL-SCNC: 138 MMOL/L (ref 136–145)
UNIT ABO: NORMAL
UNIT ABO: NORMAL
UNIT NUMBER: NORMAL
UNIT NUMBER: NORMAL
UNIT RH: NORMAL
UNIT RH: NORMAL
UNIT VOLUME: 350
UNIT VOLUME: 350
WBC # BLD AUTO: 6.2 X10*3/UL (ref 4.4–11.3)
XM INTEP: NORMAL
XM INTEP: NORMAL

## 2025-07-23 PROCEDURE — 85730 THROMBOPLASTIN TIME PARTIAL: CPT

## 2025-07-23 PROCEDURE — 86901 BLOOD TYPING SEROLOGIC RH(D): CPT

## 2025-07-23 PROCEDURE — 85025 COMPLETE CBC W/AUTO DIFF WBC: CPT

## 2025-07-23 PROCEDURE — 36430 TRANSFUSION BLD/BLD COMPNT: CPT

## 2025-07-23 PROCEDURE — 84075 ASSAY ALKALINE PHOSPHATASE: CPT

## 2025-07-23 PROCEDURE — 86923 COMPATIBILITY TEST ELECTRIC: CPT

## 2025-07-23 PROCEDURE — 84702 CHORIONIC GONADOTROPIN TEST: CPT

## 2025-07-23 PROCEDURE — 99285 EMERGENCY DEPT VISIT HI MDM: CPT | Mod: 25 | Performed by: STUDENT IN AN ORGANIZED HEALTH CARE EDUCATION/TRAINING PROGRAM

## 2025-07-23 PROCEDURE — 36415 COLL VENOUS BLD VENIPUNCTURE: CPT

## 2025-07-23 PROCEDURE — P9016 RBC LEUKOCYTES REDUCED: HCPCS

## 2025-07-23 ASSESSMENT — PAIN - FUNCTIONAL ASSESSMENT: PAIN_FUNCTIONAL_ASSESSMENT: 0-10

## 2025-07-23 ASSESSMENT — PAIN SCALES - GENERAL: PAINLEVEL_OUTOF10: 0 - NO PAIN

## 2025-07-23 NOTE — ED PROVIDER NOTES
HPI   Chief Complaint   Patient presents with    Labs Only     Sent by PCP for blood transfusion       History provided by: Patient    Limitations to history: None    CC: Anemia    HPI: 40-year-old female with a history of iron deficient anemia and IBS presents the emergency department to be evaluated for anemia.  Patient has a known history of iron deficient anemia from heavy menstrual cycles.  Patient was not able to tolerate oral iron well given her history of IBS so she has had intermittent iron transfusions.  Patient is not having any vaginal bleeding currently.  Denies hematemesis, hemoptysis, melena or hematochezia.  Denies hematuria.  Denies abdominal pain or flank pain.  Denies vaginal discharge.  Denies any urinary complaints.  Denies weakness but does report fatigue which is chronic for her due to her anemia.  She denies fever and chills.  Denies headache or neck pain.  Patient has been anemic in the past but is never required a blood transfusion.  Patient had outpatient routine blood work performed yesterday and she was told to come to the ER for hemoglobin of 6.3.  Denies all other systemic symptoms.    ROS: Negative unless mentioned in HPI    Medical Hx: Allergies reviewed.  Immunizations are up-to-date.    Physical exam:    Constitutional: Sitting comfortably in the room and in no distress.  Oriented to person, place, time, and situation.    HEENT: Head is normocephalic, atraumatic. Patient's airway is patent.  Tympanic membranes are clear bilaterally.  Nasal mucosa clear.  Mouth with normal mucosa.  Throat is not erythematous and there are no oropharyngeal exudates, uvula is midline.  No obvious facial deformities.    Eyes: Clear bilaterally.  Pupils are equal round and reactive to light and accommodation.  Extraocular movements intact.      Cardiac: Regular rate, regular rhythm.  Heart sounds S1, S2.  No murmurs, rubs, or gallops.  PMI nondisplaced.  No JVD.    Respiratory: Regular respiratory rate  and effort.  Breath sounds are clear and equal bilaterally, no adventitious lung sounds.  Patient is speaking in full sentences and is in no apparent respiratory distress. No use of accessory muscles.      Gastrointestinal: Abdomen is soft, nondistended, and nontender.  There are no obvious deformities.  No rebound tenderness or guarding.  Bowel sounds are normal active.    Genitourinary: No CVA or flank tenderness.    Musculoskeletal: No reproducible tenderness.  No obvious skin or bony deformities.  Patient has equal range of motion in all extremities and no strength deficiencies.  No muscle or joint tenderness. No back or neck tenderness.  Capillary refill less than 3 seconds.  Strong peripheral pulses.  No sensory deficits.    Neurological: Patient is alert and oriented.  No focal deficits.  5/5 strength in all extremities.  Cranial nerves II through XII intact. GCS15.     Skin: Skin is normal color for race and is warm, dry, and intact.  No evidence of trauma.  No lesions, rashes, bruising, jaundice, or masses.    Psych: Appropriate mood and affect.  No apparent risk to self or others.    Heme/lymph: No adenopathy, lymphadenopathy, or splenomegaly    Physical exam is otherwise negative unless stated above or in history of present illness.              Patient History   Medical History[1]  Surgical History[2]  Family History[3]  Social History[4]    Physical Exam   ED Triage Vitals [07/23/25 1252]   Temperature Heart Rate Respirations BP   36.3 °C (97.3 °F) 71 18 173/88      Pulse Ox Temp Source Heart Rate Source Patient Position   99 % Temporal Monitor Sitting      BP Location FiO2 (%)     Right arm --       Physical Exam      ED Course & MDM   Diagnoses as of 07/23/25 1504   Iron deficiency anemia due to chronic blood loss     Patient updated on plan for lab testing, IV insertion, radiology imaging, and medications to be administered while in the ER (if indicated). Patient updated on expected wait times for  testing and results. Patient provided my name and told to ask any staff member for questions or concerns if they should arise. Electronic medical record reviewed.     MDM    Patient presented to the emergency department with the chief complaint of anemia.  Examination of the heart and lungs unremarkable.  Abdomen is soft, nontender, nondistended.  On arrival to the emergency department, vital signs were within normal limits    Will obtain basic blood work, pregnancy test, coagulation screen and type and screen.    CBC confirms a microcytic anemia hemoglobin 6.6.  Consent was obtained and the patient was given 2 units of packed red blood cells.  Patient's ferritin level as an outpatient was decreased.  Patient's anemia is likely from iron deficient anemia.  Patient's pregnancy test is negative.  CMP is unremarkable.  Coagulation screen does not reveal any obvious findings that the patient is susceptibility to bleeding.  Patient will be given the 2 units of packed red blood cells and then discharged to follow-up with her primary care provider.  She will discuss having another iron infusion with her PCP, she has not had one in several months.  Discussed supportive treatment.  All questions and concerns addressed.  Reasons to return to ER discussed.  Patient verbalized understanding and agreement with the treatment plan and they remained hemodynamically stable in the ER.      This note was dictated using a speech recognition program.  While an attempt was made at proof-reading to minimize errors, minor errors in transcription may be present            No data recorded     Willard Coma Scale Score: 15 (07/23/25 1252 : Yecenia Casillas RN)                           Medical Decision Making      Procedure  Procedures       [1]   Past Medical History:  Diagnosis Date    Anemia     Anxiety     Arthritis     Asthma     Clotting disorder (Multi) Anemia    COVID-19     VACCINATED    Depression     GERD (gastroesophageal reflux  disease)     Hiatal hernia     Irritable bowel syndrome     Migraines     Multiple personality disorder (Multi)     pt reported    Pap test, as part of routine gynecological examination 2021    LGSIL, hpv negative-saw gyn for bx    Recurrent pregnancy loss     Sleep apnea     cpap- does not use    Wears glasses    [2]   Past Surgical History:  Procedure Laterality Date    CARPAL TUNNEL RELEASE      RIGHT    CERVICAL BIOPSY  10/2021    DILATION AND CURETTAGE OF UTERUS  2015    x3-//    SLEEVE GASTROPLASTY  2018    TUBAL LIGATION  2020    WISDOM TOOTH EXTRACTION     [3]   Family History  Problem Relation Name Age of Onset    Other (cardiac disorder) Mother      Diabetes Mother      Kidney failure Mother      Obesity Mother          morbid    Other (cardiac disorder) Father      Leukemia Father      Obesity Father          morbid    Anemia Mother Serenity Franchesca     Kidney disease Mother Serenity Franchesca     Depression Mother Serenity Franchesca     Heart attack Mother Serenity Franchesca     Heart disease Mother Serenity Franchesca     Diabetes type II Mother Serenity Franchesca     Cancer Father Bret Franchesca     Heart attack Father Bret Franchesca     Miscarriages / Stillbirths Other Self     Motion Sickness Other Self    [4]   Social History  Tobacco Use    Smoking status: Former     Current packs/day: 0.00     Average packs/day: 0.3 packs/day for 5.0 years (1.3 ttl pk-yrs)     Types: Cigarettes     Quit date:      Years since quittin.5    Smokeless tobacco: Never   Vaping Use    Vaping status: Never Used   Substance Use Topics    Alcohol use: Yes     Comment: rare    Drug use: Not Currently     Types: Marijuana     Comment: once a month        Eligio Tanner PA-C  25 1505

## 2025-07-25 LAB
BACTERIA GENITAL AEROBE CULT: NORMAL
C TRACH RRNA SPEC QL NAA+PROBE: NOT DETECTED
N GONORRHOEA RRNA SPEC QL NAA+PROBE: NOT DETECTED
QUEST GC CT AMPLIFIED (ALWAYS MESSAGE): NORMAL

## 2025-07-26 LAB
1,25(OH)2D SERPL-MCNC: 41 PG/ML (ref 18–72)
1,25(OH)2D2 SERPL-MCNC: <8 PG/ML
1,25(OH)2D3 SERPL-MCNC: 41 PG/ML
ALBUMIN SERPL-MCNC: 4.1 G/DL (ref 3.6–5.1)
ALP SERPL-CCNC: 72 U/L (ref 31–125)
ALT SERPL-CCNC: 8 U/L (ref 6–29)
ANION GAP SERPL CALCULATED.4IONS-SCNC: 6 MMOL/L (CALC) (ref 7–17)
AST SERPL-CCNC: 13 U/L (ref 10–30)
BASOPHILS # BLD AUTO: 32 CELLS/UL (ref 0–200)
BASOPHILS NFR BLD AUTO: 0.7 %
BILIRUB SERPL-MCNC: 0.2 MG/DL (ref 0.2–1.2)
BUN SERPL-MCNC: 13 MG/DL (ref 7–25)
CALCIUM SERPL-MCNC: 8.6 MG/DL (ref 8.6–10.2)
CHLORIDE SERPL-SCNC: 107 MMOL/L (ref 98–110)
CHOLEST SERPL-MCNC: 171 MG/DL
CHOLEST/HDLC SERPL: 3.4 (CALC)
CO2 SERPL-SCNC: 27 MMOL/L (ref 20–32)
CREAT SERPL-MCNC: 0.57 MG/DL (ref 0.5–0.97)
EGFRCR SERPLBLD CKD-EPI 2021: 118 ML/MIN/1.73M2
EOSINOPHIL # BLD AUTO: 41 CELLS/UL (ref 15–500)
EOSINOPHIL NFR BLD AUTO: 0.9 %
ERYTHROCYTE [DISTWIDTH] IN BLOOD BY AUTOMATED COUNT: 17.1 % (ref 11–15)
EST. AVERAGE GLUCOSE BLD GHB EST-MCNC: 108 MG/DL
EST. AVERAGE GLUCOSE BLD GHB EST-SCNC: 6 MMOL/L
FERRITIN SERPL-MCNC: 6 NG/ML (ref 16–154)
GLUCOSE SERPL-MCNC: 88 MG/DL (ref 65–99)
HBA1C MFR BLD: 5.4 %
HCT VFR BLD AUTO: 23.9 % (ref 35–45)
HDLC SERPL-MCNC: 50 MG/DL
HGB BLD-MCNC: 6.3 G/DL (ref 11.7–15.5)
LDLC SERPL CALC-MCNC: 107 MG/DL (CALC)
LYMPHOCYTES # BLD AUTO: 1256 CELLS/UL (ref 850–3900)
LYMPHOCYTES NFR BLD AUTO: 27.9 %
MCH RBC QN AUTO: 17.9 PG (ref 27–33)
MCHC RBC AUTO-ENTMCNC: 26.4 G/DL (ref 32–36)
MCV RBC AUTO: 68.1 FL (ref 80–100)
MONOCYTES # BLD AUTO: 455 CELLS/UL (ref 200–950)
MONOCYTES NFR BLD AUTO: 10.1 %
NEUTROPHILS # BLD AUTO: 2718 CELLS/UL (ref 1500–7800)
NEUTROPHILS NFR BLD AUTO: 60.4 %
NONHDLC SERPL-MCNC: 121 MG/DL (CALC)
PLATELET # BLD AUTO: 259 THOUSAND/UL (ref 140–400)
PMV BLD REES-ECKER: 11.4 FL (ref 7.5–12.5)
POTASSIUM SERPL-SCNC: 4.3 MMOL/L (ref 3.5–5.3)
PROT SERPL-MCNC: 6.9 G/DL (ref 6.1–8.1)
RBC # BLD AUTO: 3.51 MILLION/UL (ref 3.8–5.1)
SERVICE CMNT-IMP: ABNORMAL
SODIUM SERPL-SCNC: 140 MMOL/L (ref 135–146)
T4 FREE SERPL-MCNC: 1 NG/DL (ref 0.8–1.8)
TRIGL SERPL-MCNC: 57 MG/DL
TSH SERPL-ACNC: 2.37 MIU/L
VIT B1 BLD-SCNC: 70 NMOL/L (ref 78–185)
VIT B12 SERPL-MCNC: 363 PG/ML (ref 200–1100)
WBC # BLD AUTO: 4.5 THOUSAND/UL (ref 3.8–10.8)
ZINC SERPL-MCNC: 69 MCG/DL (ref 60–130)

## 2025-07-29 ENCOUNTER — APPOINTMENT (OUTPATIENT)
Dept: PRIMARY CARE | Facility: CLINIC | Age: 40
End: 2025-07-29
Payer: COMMERCIAL

## 2025-07-29 ENCOUNTER — OFFICE VISIT (OUTPATIENT)
Dept: PRIMARY CARE | Facility: CLINIC | Age: 40
End: 2025-07-29
Payer: COMMERCIAL

## 2025-07-29 VITALS
TEMPERATURE: 97.1 F | DIASTOLIC BLOOD PRESSURE: 78 MMHG | WEIGHT: 263 LBS | SYSTOLIC BLOOD PRESSURE: 122 MMHG | BODY MASS INDEX: 46.6 KG/M2 | HEIGHT: 63 IN | RESPIRATION RATE: 16 BRPM | HEART RATE: 76 BPM

## 2025-07-29 DIAGNOSIS — F32.A ANXIETY AND DEPRESSION: ICD-10-CM

## 2025-07-29 DIAGNOSIS — E61.1 IRON DEFICIENCY: ICD-10-CM

## 2025-07-29 DIAGNOSIS — F41.9 ANXIETY AND DEPRESSION: ICD-10-CM

## 2025-07-29 DIAGNOSIS — N18.9 ANEMIA DUE TO CHRONIC KIDNEY DISEASE, UNSPECIFIED CKD STAGE: ICD-10-CM

## 2025-07-29 DIAGNOSIS — D50.8 OTHER IRON DEFICIENCY ANEMIA: ICD-10-CM

## 2025-07-29 DIAGNOSIS — Z90.3 HISTORY OF SLEEVE GASTRECTOMY: ICD-10-CM

## 2025-07-29 DIAGNOSIS — N92.4 EXCESSIVE BLEEDING IN PREMENOPAUSAL PERIOD: Primary | ICD-10-CM

## 2025-07-29 DIAGNOSIS — D63.1 ANEMIA DUE TO CHRONIC KIDNEY DISEASE, UNSPECIFIED CKD STAGE: ICD-10-CM

## 2025-07-29 LAB — POC HEMOGLOBIN: 8.5 G/DL (ref 12–16)

## 2025-07-29 PROCEDURE — 99213 OFFICE O/P EST LOW 20 MIN: CPT | Performed by: FAMILY MEDICINE

## 2025-07-29 PROCEDURE — 85018 HEMOGLOBIN: CPT | Performed by: FAMILY MEDICINE

## 2025-07-29 PROCEDURE — 3008F BODY MASS INDEX DOCD: CPT | Performed by: FAMILY MEDICINE

## 2025-07-29 RX ORDER — ALBUTEROL SULFATE 0.83 MG/ML
3 SOLUTION RESPIRATORY (INHALATION) AS NEEDED
Status: CANCELLED | OUTPATIENT
Start: 2025-07-29

## 2025-07-29 RX ORDER — DIPHENHYDRAMINE HYDROCHLORIDE 50 MG/ML
50 INJECTION, SOLUTION INTRAMUSCULAR; INTRAVENOUS AS NEEDED
Status: CANCELLED | OUTPATIENT
Start: 2025-07-29

## 2025-07-29 RX ORDER — FAMOTIDINE 10 MG/ML
20 INJECTION, SOLUTION INTRAVENOUS ONCE AS NEEDED
Status: CANCELLED | OUTPATIENT
Start: 2025-07-29

## 2025-07-29 RX ORDER — EPINEPHRINE 0.3 MG/.3ML
0.3 INJECTION SUBCUTANEOUS EVERY 5 MIN PRN
Status: CANCELLED | OUTPATIENT
Start: 2025-07-29

## 2025-07-29 NOTE — PROGRESS NOTES
"Subjective   Patient ID: Katelynn Avery is a 40 y.o. female who presents for   Chief Complaint   Patient presents with   • ER Follow-up     Anemia received 2 units of blood   Covid vax: x 4  Flu: n/a     Pap: 7/2025-results pending  Lmp: 2 weeks ago    HPI  Problem List[1]    Surgical History[2]    Review of Systems  This patient has  NO history of seizures/ CAD or CVA    NO history of recent Covid nor flu symptoms,    NO Fever nor chills today,    NO Chest pain, shortness of breath nor paroxysmal nocturnal dyspnea,  NO Nausea, vomiting, nor diarrhea,  NO Hematochezia nor melena,  NO Dysuria, hematuria, nor new incontinence issues  NO new severe headaches nor neurological complaints,  NO new issues with anxiety nor depression nor new psychiatric complaints,  NO suicidal nor homicidal ideations.     OBJECTIVE:  /78   Pulse 76   Temp 36.2 °C (97.1 °F) (Temporal)   Resp 16   Ht 1.6 m (5' 3\")   Wt 119 kg (263 lb)   LMP 07/15/2025 (Approximate)   BMI 46.59 kg/m²      General:  alert, oriented, no acute distress.  No obvious skin rashes noted.       Mood is pleasant,  no signs of emotional distress.   Not appearing intoxicated or altered.   No voiced delusions,   Normal, appropriate behavior.    HEENT: Normocephalic, atraumatic,   Pupils round, reactive to light  Extraocular motions intact and wnl  Tympanic membranes normal    Neck: no nuchal rigidity  No masses palpable.  No carotid bruits.  No thyromegaly.    Respiratory: Equal breath sounds  No wheezes,    rales,    nor rhonchi  No respiratory distress.    Heart: Regular rate and rhythm, no    murmurs  no rubs/gallops    Abdomen: no masses palpable, nontender, no rebound nor guarding.  ovwt  Extremities: NO cyanosis noted, no clubbing.   No edema noted.  2+dorsalis pedis pulses.    Normal-not antalgic, steady gait.    Office Visit on 07/29/2025   Component Date Value Ref Range Status   • POC Hemoglobin 07/29/2025 8.5 (A)  12 - 16 g/dL Final   Admission " on 07/23/2025, Discharged on 07/23/2025   Component Date Value Ref Range Status   • WBC 07/23/2025 6.2  4.4 - 11.3 x10*3/uL Final   • nRBC 07/23/2025 0.0  0.0 - 0.0 /100 WBCs Final   • RBC 07/23/2025 3.58 (L)  4.00 - 5.20 x10*6/uL Final   • Hemoglobin 07/23/2025 6.6 (L)  12.0 - 16.0 g/dL Final   • Hematocrit 07/23/2025 24.1 (L)  36.0 - 46.0 % Final   • MCV 07/23/2025 67 (L)  80 - 100 fL Final   • MCH 07/23/2025 18.4 (L)  26.0 - 34.0 pg Final   • MCHC 07/23/2025 27.4 (L)  32.0 - 36.0 g/dL Final   • RDW 07/23/2025 18.2 (H)  11.5 - 14.5 % Final   • Platelets 07/23/2025 269  150 - 450 x10*3/uL Final   • Neutrophils % 07/23/2025 63.6  40.0 - 80.0 % Final   • Immature Granulocytes %, Automated 07/23/2025 0.2  0.0 - 0.9 % Final    Immature Granulocyte Count (IG) includes promyelocytes, myelocytes and metamyelocytes but does not include bands. Percent differential counts (%) should be interpreted in the context of the absolute cell counts (cells/UL).   • Lymphocytes % 07/23/2025 24.1  13.0 - 44.0 % Final   • Monocytes % 07/23/2025 10.7  2.0 - 10.0 % Final   • Eosinophils % 07/23/2025 0.8  0.0 - 6.0 % Final   • Basophils % 07/23/2025 0.6  0.0 - 2.0 % Final   • Neutrophils Absolute 07/23/2025 3.92  1.20 - 7.70 x10*3/uL Final    Percent differential counts (%) should be interpreted in the context of the absolute cell counts (cells/uL).   • Immature Granulocytes Absolute, Au* 07/23/2025 0.01  0.00 - 0.70 x10*3/uL Final   • Lymphocytes Absolute 07/23/2025 1.49  1.20 - 4.80 x10*3/uL Final   • Monocytes Absolute 07/23/2025 0.66  0.10 - 1.00 x10*3/uL Final   • Eosinophils Absolute 07/23/2025 0.05  0.00 - 0.70 x10*3/uL Final   • Basophils Absolute 07/23/2025 0.04  0.00 - 0.10 x10*3/uL Final   • Glucose 07/23/2025 89  74 - 99 mg/dL Final   • Sodium 07/23/2025 138  136 - 145 mmol/L Final   • Potassium 07/23/2025 4.1  3.5 - 5.3 mmol/L Final   • Chloride 07/23/2025 106  98 - 107 mmol/L Final   • Bicarbonate 07/23/2025 24  21 - 32 mmol/L  Final   • Anion Gap 07/23/2025 12  10 - 20 mmol/L Final   • Urea Nitrogen 07/23/2025 12  6 - 23 mg/dL Final   • Creatinine 07/23/2025 0.56  0.50 - 1.05 mg/dL Final   • eGFR 07/23/2025 >90  >60 mL/min/1.73m*2 Final    Calculations of estimated GFR are performed using the 2021 CKD-EPI Study Refit equation without the race variable for the IDMS-Traceable creatinine methods.  https://jasn.asnjournals.org/content/early/2021/09/22/ASN.5313834248   • Calcium 07/23/2025 8.6  8.6 - 10.3 mg/dL Final   • Albumin 07/23/2025 4.0  3.4 - 5.0 g/dL Final   • Alkaline Phosphatase 07/23/2025 68  33 - 110 U/L Final   • Total Protein 07/23/2025 7.3  6.4 - 8.2 g/dL Final   • AST 07/23/2025 12  9 - 39 U/L Final   • Bilirubin, Total 07/23/2025 0.3  0.0 - 1.2 mg/dL Final   • ALT 07/23/2025 8  7 - 45 U/L Final    Patients treated with Sulfasalazine may generate falsely decreased results for ALT.   • ABO TYPE 07/23/2025 A   Final   • Rh TYPE 07/23/2025 POS   Final   • ANTIBODY SCREEN 07/23/2025 NEG   Final   • HCG, Beta-Quantitative 07/23/2025 <2  <5 mIU/mL Final   • Protime 07/23/2025 12.1  9.8 - 12.4 seconds Final   • INR 07/23/2025 1.1  0.9 - 1.1 Final   • aPTT 07/23/2025 27  26 - 36 seconds Final   • PRODUCT CODE 07/23/2025 [id number]   Final   • Unit Number 07/23/2025 [id number]   Final   • Unit ABO 07/23/2025 A   Final   • Unit RH 07/23/2025 POS   Final   • XM INTEP 07/23/2025 COMP   Final   • Dispense Status 07/23/2025 TR   Final   • Blood Expiration Date 07/23/2025 8/1/2025 11:59:00 PM EDT   Final   • PRODUCT BLOOD TYPE 07/23/2025 6200   Final   • UNIT VOLUME 07/23/2025 350   Final-Edited   • PRODUCT CODE 07/23/2025 [id number]   Final   • Unit Number 07/23/2025 [id number]   Final   • Unit ABO 07/23/2025 A   Final   • Unit RH 07/23/2025 POS   Final   • XM INTEP 07/23/2025 COMP   Final   • Dispense Status 07/23/2025 TR   Final   • Blood Expiration Date 07/23/2025 8/1/2025 11:59:00 PM EDT   Final   • PRODUCT BLOOD TYPE 07/23/2025  6200   Final   • UNIT VOLUME 07/23/2025 350   Final-Edited   Office Visit on 07/22/2025   Component Date Value Ref Range Status   • WHITE BLOOD CELL COUNT 07/22/2025 4.5  3.8 - 10.8 Thousand/uL Final   • RED BLOOD CELL COUNT 07/22/2025 3.51 (L)  3.80 - 5.10 Million/uL Final   • HEMOGLOBIN 07/22/2025 6.3 (L)  11.7 - 15.5 g/dL Final    Comment: Verified by repeat analysis.        • HEMATOCRIT 07/22/2025 23.9 (L)  35.0 - 45.0 % Final   • MCV 07/22/2025 68.1 (L)  80.0 - 100.0 fL Final   • MCH 07/22/2025 17.9 (L)  27.0 - 33.0 pg Final   • MCHC 07/22/2025 26.4 (L)  32.0 - 36.0 g/dL Final    Comment: For adults, a slight decrease in the calculated MCHC  value (in the range of 30 to 32 g/dL) is most likely  not clinically significant; however, it should be  interpreted with caution in correlation with other  red cell parameters and the patient's clinical  condition.     • RDW 07/22/2025 17.1 (H)  11.0 - 15.0 % Final   • PLATELET COUNT 07/22/2025 259  140 - 400 Thousand/uL Final   • MPV 07/22/2025 11.4  7.5 - 12.5 fL Final   • ABSOLUTE NEUTROPHILS 07/22/2025 2,718  1,500 - 7,800 cells/uL Final   • ABSOLUTE LYMPHOCYTES 07/22/2025 1,256  850 - 3,900 cells/uL Final   • ABSOLUTE MONOCYTES 07/22/2025 455  200 - 950 cells/uL Final   • ABSOLUTE EOSINOPHILS 07/22/2025 41  15 - 500 cells/uL Final   • ABSOLUTE BASOPHILS 07/22/2025 32  0 - 200 cells/uL Final   • NEUTROPHILS 07/22/2025 60.4  % Final   • LYMPHOCYTES 07/22/2025 27.9  % Final   • MONOCYTES 07/22/2025 10.1  % Final   • EOSINOPHILS 07/22/2025 0.9  % Final   • BASOPHILS 07/22/2025 0.7  % Final   • COMMENT(S) 07/22/2025    Final    Comment: Tear-drop cells 2 +  Elliptocytes 2 +  Anisocytosis 2 +  Polychromasia 1 +     • GLUCOSE 07/22/2025 88  65 - 99 mg/dL Final    Comment:               Fasting reference interval        • UREA NITROGEN (BUN) 07/22/2025 13  7 - 25 mg/dL Final   • CREATININE 07/22/2025 0.57  0.50 - 0.97 mg/dL Final   • EGFR 07/22/2025 118  > OR = 60  mL/min/1.73m2 Final   • SODIUM 07/22/2025 140  135 - 146 mmol/L Final   • POTASSIUM 07/22/2025 4.3  3.5 - 5.3 mmol/L Final   • CHLORIDE 07/22/2025 107  98 - 110 mmol/L Final   • CARBON DIOXIDE 07/22/2025 27  20 - 32 mmol/L Final   • ELECTROLYTE BALANCE 07/22/2025 6 (L)  7 - 17 mmol/L (calc) Final   • CALCIUM 07/22/2025 8.6  8.6 - 10.2 mg/dL Final   • PROTEIN, TOTAL 07/22/2025 6.9  6.1 - 8.1 g/dL Final   • ALBUMIN 07/22/2025 4.1  3.6 - 5.1 g/dL Final   • BILIRUBIN, TOTAL 07/22/2025 0.2  0.2 - 1.2 mg/dL Final   • ALKALINE PHOSPHATASE 07/22/2025 72  31 - 125 U/L Final   • AST 07/22/2025 13  10 - 30 U/L Final   • ALT 07/22/2025 8  6 - 29 U/L Final   • HEMOGLOBIN A1c 07/22/2025 5.4  <5.7 % Final    Comment: For the purpose of screening for the presence of  diabetes:     <5.7%       Consistent with the absence of diabetes  5.7-6.4%    Consistent with increased risk for diabetes              (prediabetes)  > or =6.5%  Consistent with diabetes     This assay result is consistent with a decreased risk  of diabetes.     Currently, no consensus exists regarding use of  hemoglobin A1c for diagnosis of diabetes in children.     According to American Diabetes Association (ADA)  guidelines, hemoglobin A1c <7.0% represents optimal  control in non-pregnant diabetic patients. Different  metrics may apply to specific patient populations.   Standards of Medical Care in Diabetes(ADA).         • eAG (mg/dL) 07/22/2025 108  mg/dL Final   • eAG (mmol/L) 07/22/2025 6.0  mmol/L Final   • CHOLESTEROL, TOTAL 07/22/2025 171  <200 mg/dL Final   • HDL CHOLESTEROL 07/22/2025 50  > OR = 50 mg/dL Final   • TRIGLYCERIDES 07/22/2025 57  <150 mg/dL Final   • LDL-CHOLESTEROL 07/22/2025 107 (H)  mg/dL (calc) Final    Comment: Reference range: <100     Desirable range <100 mg/dL for primary prevention;    <70 mg/dL for patients with CHD or diabetic patients   with > or = 2 CHD risk factors.     LDL-C is now calculated using the Golden-Welch    calculation, which is a validated novel method providing   better accuracy than the Friedewald equation in the   estimation of LDL-C.   Golden SS et al. PETROS. 2013;310(19): 4486-3447   (http://education.LawDeck/faq/QLV079)     • CHOL/HDLC RATIO 07/22/2025 3.4  <5.0 (calc) Final   • NON HDL CHOLESTEROL 07/22/2025 121  <130 mg/dL (calc) Final    Comment: For patients with diabetes plus 1 major ASCVD risk   factor, treating to a non-HDL-C goal of <100 mg/dL   (LDL-C of <70 mg/dL) is considered a therapeutic   option.     • VITAMIN B12 07/22/2025 363  200 - 1,100 pg/mL Final    Comment:    Please Note: Although the reference range for vitamin  B12 is 200-1100 pg/mL, it has been reported that between  5 and 10% of patients with values between 200 and 400  pg/mL may experience neuropsychiatric and hematologic  abnormalities due to occult B12 deficiency; less than 1%  of patients with values above 400 pg/mL will have symptoms.        • VITAMIN D, 1,25 (OH)2, TOTAL 07/22/2025 41  18 - 72 pg/mL Final   • VITAMIN D3, 1,25 (OH)2 07/22/2025 41  pg/mL Final   • VITAMIN D2, 1,25 (OH)2 07/22/2025 <8  pg/mL Final    Comment:    Vitamin D3, 1,25(OH)2 indicates both endogenous  production and supplementation. Vitamin D2, 1,25(OH)2  is an indicator of exogenous sources, such as diet or  supplementation.  Interpretation and therapy are based  on measurement of Vitamin D,1,25(OH)2, Total.        This test was developed and its analytical  performance characteristics have been determined  by [facility], [place], [place].  It has not been cleared or approved by the FDA. This  assay has been validated pursuant to the CLIA  regulations and is used for clinical purposes.        • TSH 07/22/2025 2.37  mIU/L Final    Comment:           Reference Range                         > or = 20 Years  0.40-4.50                              Pregnancy Ranges            First trimester    0.26-2.66            Second  trimester   0.55-2.73            Third trimester    0.43-2.91     • T4, FREE 07/22/2025 1.0  0.8 - 1.8 ng/dL Final   • FERRITIN 07/22/2025 6 (L)  16 - 154 ng/mL Final   • VITAMIN B1 (THIAMINE), BLOOD, LC/M* 07/22/2025 70 (L)  78 - 185 nmol/L Final    Comment:    Vitamin supplementation within 24 hours prior to  blood draw may affect the accuracy of the results.     This test was developed and its analytical performance  characteristics have been determined by [facility] [place], [place]. It has  not been cleared or approved by the U.S. Food and Drug  Administration. This assay has been validated pursuant  to the CLIA regulations and is used for clinical  purposes.        • ZINC 07/22/2025 69  60 - 130 mcg/dL Final    Comment:    This test was developed and its analytical performance  characteristics have been determined by [facility] [place], [place]. It has  not been cleared or approved by the U.S. Food and Drug  Administration. This assay has been validated pursuant  to the CLIA regulations and is used for clinical  purposes.        • CULTURE, GENITAL 07/22/2025 SEE NOTE   Final    Comment:     CULTURE, GENITAL         Micro Number:      [id number]    Test Status:       Final    Specimen Source:   Not given    Specimen Quality:  Adequate    Result:            Growth of normal urogenital dorinda.     • CHLAMYDIA TRACHOMATIS RNA, TMA, UR* 07/22/2025 NOT DETECTED  NOT DETECTED Final   • NEISSERIA GONORRHOEAE RNA, TMA, UR* 07/22/2025 NOT DETECTED  NOT DETECTED Final   • (Always Message) 07/22/2025    Final    Comment: The analytical performance characteristics of this  assay, when used to test SurePath(TM) specimens have been  determined by [facility]. The modifications have  not been cleared or approved by the FDA. This assay has  been validated pursuant to the CLIA regulations and is  used for clinical purposes.     For additional information, please refer  to  https://education.Concilio Networks/faq/NSF343  (This link is being provided for information/  educational purposes only.)             Assessment/Plan     Problem List Items Addressed This Visit       Anemia due to chronic kidney disease    Relevant Orders    POCT Hemoglobin manually resulted (Completed)    US pelvis    Anxiety and depression    History of sleeve gastrectomy    Relevant Orders    US pelvis    Iron deficiency    Relevant Orders    US pelvis     Other Visit Diagnoses         Excessive bleeding in premenopausal period    -  Primary    Relevant Orders    US pelvis    Referral to Gynecology          Needs iron transfusion  Get pelvic ultrasound  To gyn   Get vit b1  And reqs wegovy    Katelynn Avery -be aware that any referrals discussed should be placed today or tests/labs ordered should result in prompt scheduling today.   If not done today-then a phone call for scheduling is expected in a timely manner(within 2 weeks).   If testing is to be done-a result should be available to patient within 2 weeks time unless otherwise specified.   You, the patient or caregiver, are responsible for making sure what was discussed is actually scheduled and completed.  If suboptimal understanding of results of tests or referral reason-a follow up appointment with me should be made.  If above does NOT occur-you are to connect with us for an explanation.    Follow up at next scheduled visit -as planned or directed today.  Sooner if new or unresolved issues of concern.    Katelynn Avery We know you have a choice for your health care, THANK YOU for choosing  and [facility].  We APPRECIATE YOU.  Sincerely,   Malini Foster MD   (dr. Gonzalez)               [1]  Patient Active Problem List  Diagnosis   • Abdominal obesity and metabolic syndrome   • Anemia   • Anemia due to chronic kidney disease   • Anxiety and depression   • Sleep apnea   • Chronic pain of right knee   • GERD (gastroesophageal  reflux disease)   • Hearing loss   • Hiatal hernia   • History of sleeve gastrectomy   • Calcium deficiency   • Iron deficiency   • Pap smear abnormality of cervix with ASCUS favoring benign   • Polyphagia   • Postoperative malabsorption (HHS-HCC)   • Primary osteoarthritis of right knee   • Psychological factors affecting morbid obesity (Multi)   • Sensorineural hearing loss (SNHL) of right ear with unrestricted hearing of left ear   • Tinnitus of right ear   • Vitamin A deficiency   • Vitamin D deficiency   • Vitamin B 12 deficiency   • Vitamin B1 deficiency   • Weight gain following gastric bypass surgery   • Abdominal bloating   • Other constipation   • Class 3 severe obesity in adult   • Seasonal allergies   • Anxiety   • Irritable bowel syndrome   • Carpal tunnel syndrome, right upper limb   • Carpal tunnel syndrome, left upper limb   [2]  Past Surgical History:  Procedure Laterality Date   • CARPAL TUNNEL RELEASE      RIGHT   • CERVICAL BIOPSY  10/2021   • DILATION AND CURETTAGE OF UTERUS  2015    x3-2003/2008/2010/2015   • SLEEVE GASTROPLASTY  08/2018   • TUBAL LIGATION  2020   • WISDOM TOOTH EXTRACTION

## 2025-07-30 ENCOUNTER — HOSPITAL ENCOUNTER (OUTPATIENT)
Dept: RADIOLOGY | Facility: HOSPITAL | Age: 40
Discharge: HOME | End: 2025-07-30
Payer: COMMERCIAL

## 2025-07-30 DIAGNOSIS — N92.4 EXCESSIVE BLEEDING IN PREMENOPAUSAL PERIOD: ICD-10-CM

## 2025-07-30 DIAGNOSIS — E61.1 IRON DEFICIENCY: ICD-10-CM

## 2025-07-30 DIAGNOSIS — N18.9 ANEMIA DUE TO CHRONIC KIDNEY DISEASE, UNSPECIFIED CKD STAGE: ICD-10-CM

## 2025-07-30 DIAGNOSIS — Z90.3 HISTORY OF SLEEVE GASTRECTOMY: ICD-10-CM

## 2025-07-30 DIAGNOSIS — D63.1 ANEMIA DUE TO CHRONIC KIDNEY DISEASE, UNSPECIFIED CKD STAGE: ICD-10-CM

## 2025-07-30 PROCEDURE — 76856 US EXAM PELVIC COMPLETE: CPT | Performed by: RADIOLOGY

## 2025-07-30 PROCEDURE — 76856 US EXAM PELVIC COMPLETE: CPT

## 2025-07-30 PROCEDURE — 76830 TRANSVAGINAL US NON-OB: CPT | Performed by: RADIOLOGY

## 2025-07-31 ENCOUNTER — APPOINTMENT (OUTPATIENT)
Dept: OBSTETRICS AND GYNECOLOGY | Facility: CLINIC | Age: 40
End: 2025-07-31
Payer: COMMERCIAL

## 2025-08-01 DIAGNOSIS — D50.8 OTHER IRON DEFICIENCY ANEMIA: Primary | ICD-10-CM

## 2025-08-01 RX ORDER — ALBUTEROL SULFATE 0.83 MG/ML
3 SOLUTION RESPIRATORY (INHALATION) AS NEEDED
Status: CANCELLED | OUTPATIENT
Start: 2025-08-01

## 2025-08-01 RX ORDER — EPINEPHRINE 0.3 MG/.3ML
0.3 INJECTION SUBCUTANEOUS EVERY 5 MIN PRN
Status: CANCELLED | OUTPATIENT
Start: 2025-08-01

## 2025-08-01 RX ORDER — DIPHENHYDRAMINE HYDROCHLORIDE 50 MG/ML
50 INJECTION, SOLUTION INTRAMUSCULAR; INTRAVENOUS AS NEEDED
Status: CANCELLED | OUTPATIENT
Start: 2025-08-01

## 2025-08-01 RX ORDER — FAMOTIDINE 10 MG/ML
20 INJECTION, SOLUTION INTRAVENOUS ONCE AS NEEDED
Status: CANCELLED | OUTPATIENT
Start: 2025-08-01

## 2025-08-07 ENCOUNTER — PATIENT MESSAGE (OUTPATIENT)
Dept: PHARMACY | Facility: HOSPITAL | Age: 40
End: 2025-08-07

## 2025-08-07 ENCOUNTER — INFUSION (OUTPATIENT)
Dept: HEMATOLOGY/ONCOLOGY | Facility: CLINIC | Age: 40
End: 2025-08-07
Payer: COMMERCIAL

## 2025-08-07 ENCOUNTER — TELEMEDICINE (OUTPATIENT)
Dept: PHARMACY | Facility: HOSPITAL | Age: 40
End: 2025-08-07
Payer: COMMERCIAL

## 2025-08-07 VITALS
SYSTOLIC BLOOD PRESSURE: 138 MMHG | DIASTOLIC BLOOD PRESSURE: 79 MMHG | TEMPERATURE: 99.3 F | HEIGHT: 63 IN | RESPIRATION RATE: 20 BRPM | WEIGHT: 260 LBS | HEART RATE: 84 BPM | BODY MASS INDEX: 46.07 KG/M2

## 2025-08-07 DIAGNOSIS — E66.813 CLASS 3 SEVERE OBESITY IN ADULT: Primary | ICD-10-CM

## 2025-08-07 DIAGNOSIS — D50.8 OTHER IRON DEFICIENCY ANEMIA: ICD-10-CM

## 2025-08-07 LAB

## 2025-08-07 PROCEDURE — 2500000004 HC RX 250 GENERAL PHARMACY W/ HCPCS (ALT 636 FOR OP/ED): Mod: JZ | Performed by: PHYSICIAN ASSISTANT

## 2025-08-07 PROCEDURE — 96374 THER/PROPH/DIAG INJ IV PUSH: CPT | Mod: INF

## 2025-08-07 RX ORDER — FAMOTIDINE 10 MG/ML
20 INJECTION, SOLUTION INTRAVENOUS ONCE AS NEEDED
OUTPATIENT
Start: 2025-08-10

## 2025-08-07 RX ORDER — TIRZEPATIDE 2.5 MG/.5ML
2.5 INJECTION, SOLUTION SUBCUTANEOUS
Qty: 2 ML | Refills: 0 | Status: SHIPPED | OUTPATIENT
Start: 2025-08-07 | End: 2025-09-04

## 2025-08-07 RX ORDER — ALBUTEROL SULFATE 0.83 MG/ML
3 SOLUTION RESPIRATORY (INHALATION) AS NEEDED
OUTPATIENT
Start: 2025-08-10

## 2025-08-07 RX ORDER — DIPHENHYDRAMINE HYDROCHLORIDE 50 MG/ML
50 INJECTION, SOLUTION INTRAMUSCULAR; INTRAVENOUS AS NEEDED
OUTPATIENT
Start: 2025-08-10

## 2025-08-07 RX ORDER — EPINEPHRINE 0.3 MG/.3ML
0.3 INJECTION SUBCUTANEOUS EVERY 5 MIN PRN
OUTPATIENT
Start: 2025-08-10

## 2025-08-07 RX ADMIN — IRON SUCROSE 200 MG: 20 INJECTION, SOLUTION INTRAVENOUS at 10:42

## 2025-08-07 ASSESSMENT — PAIN SCALES - GENERAL: PAINLEVEL_OUTOF10: 0-NO PAIN

## 2025-08-07 NOTE — PROGRESS NOTES
Venofer Lexicomp information given and discussed with patient.  Patient tolerated first of five Venofer 200 mg IV without sign of adverse reaction.  Observed 20 minutes post infusion.  To return Tuesday.  Discharged in stable condition.

## 2025-08-07 NOTE — ASSESSMENT & PLAN NOTE
Current regimen includes Metformin. Informed patient of Zepbound approval today and discussed plan to start at 2.5 mg for 4 weeks and then increase to 5 mg thereafter. Patient agreeable.     Medication Changes:    CONTINUE  Metformin 500 mg QAM    START  Zepbound 2.5 mg once weekly for 4 weeks    Future Considerations:  Titration of Zepbound as tolerated.     Monitoring and Education:  Zepbound Education:   Counseled patient on Zepbound MOA, expectations, side effects, duration of therapy, administration, and monitoring parameters.  Provided detailed dosing and administration counseling to ensure proper technique.   Reviewed Zepbound titration schedule, starting with 2.5 mg once weekly to a goal of 15 mg once weekly if tolerated  Counseled patient on the benefits of GLP-1ra glycemic control and weight loss  Reviewed storage requirements of Zepbound when not in use, and when to administer the medication if a dose is missed.  Advised patient that they may experience improved satiety after meals and portion sizes of meals may be reduced as doses of Zepbound increase.  All questions and concerns addressed. Contact pharmacist with any further questions or concerns prior to next appointment.

## 2025-08-07 NOTE — PROGRESS NOTES
Clinical Pharmacy Appointment    Patient ID: Katelynn Avery is a 40 y.o. female who presents for Weight Loss.    Referring Provider: Malini Foster MD     Subjective   WEIGHT LOSS  BMI Readings from Last 3 Encounters:   08/07/25 46.06 kg/m²   07/29/25 46.59 kg/m²   07/23/25 46.77 kg/m²      Starting weight: 260 lbs. (8/7/25)  Current weight: 260 lbs.    Lifestyle  Has worked with nutritionist/dietician? Yes, last saw in 2023 for bariatric surgery  Has worked with weight management? Yes, saw bariatrics from 4051-5601 prior to bariatric surgery  Exercise:   Not discussed    Other Potential Contributing Factors  Medications that may cause weight gain: none  Mental health: No current concerns  Eating Disorders: Denies Hx of any eating disorder  Comorbidities: Anxiety, Depressed Mood, Esophageal Reflux, Joint Pain, Knee Pain, and Sleep Apnea    Pertinent PMH Review:  PMH of Pancreatitis: No  PMH of Retinopathy: No  PMH of MTC: No  PMH of MEN2: No    Non-Pharmacological Therapy  Weight loss techniques attempted:  Medically-supervised dieting: with dietician  Surgical intervention: gastric bypass in 2/2023    Pharmacological Therapy  Current Medications: Metformin 500 mg QAM  Clarifications to above regimen: None  Adverse Effects: Denies  Previous Medications: None     Insurance coverage of weight-loss medications? Yes, Zepbound PA approved for [place]  Eligible for copay cards/programs? No, however $0 copays through Medicaid if covered    Drug Interactions  No relevant drug interactions were noted.    Medication System Management  Patient's preferred pharmacy: [facility]  Adherence/Organization: Good  Affordability/Accessibility: Medications fully covered under Medicaid if covered    Objective     LMP 07/15/2025 (Approximate)      Labs  Lab Results   Component Value Date    BILITOT 0.3 07/23/2025    CALCIUM 8.6 07/23/2025    CO2 24 07/23/2025     07/23/2025    CREATININE 0.56 07/23/2025    GLUCOSE 89  07/23/2025    ALKPHOS 68 07/23/2025    K 4.1 07/23/2025    PROT 7.3 07/23/2025     07/23/2025    AST 12 07/23/2025    ALT 8 07/23/2025    BUN 12 07/23/2025    ANIONGAP 12 07/23/2025    MG 2.00 04/07/2020    ALBUMIN 4.0 07/23/2025    GFRF >90 07/06/2023     Lab Results   Component Value Date    TRIG 57 07/22/2025    CHOL 171 07/22/2025    LDLCALC 107 (H) 07/22/2025    HDL 50 07/22/2025     Lab Results   Component Value Date    HGBA1C 5.4 07/22/2025       Medications Ordered Prior to Encounter[1]     Assessment/Plan   Problem List Items Addressed This Visit           ICD-10-CM    Class 3 severe obesity in adult - Primary E66.813    Current regimen includes Metformin. Informed patient of Zepbound approval today and discussed plan to start at 2.5 mg for 4 weeks and then increase to 5 mg thereafter. Patient agreeable.     Medication Changes:    CONTINUE  Metformin 500 mg QAM    START  Zepbound 2.5 mg once weekly for 4 weeks    Future Considerations:  Titration of Zepbound as tolerated.     Monitoring and Education:  Zepbound Education:   Counseled patient on Zepbound MOA, expectations, side effects, duration of therapy, administration, and monitoring parameters.  Provided detailed dosing and administration counseling to ensure proper technique.   Reviewed Zepbound titration schedule, starting with 2.5 mg once weekly to a goal of 15 mg once weekly if tolerated  Counseled patient on the benefits of GLP-1ra glycemic control and weight loss  Reviewed storage requirements of Zepbound when not in use, and when to administer the medication if a dose is missed.  Advised patient that they may experience improved satiety after meals and portion sizes of meals may be reduced as doses of Zepbound increase.  All questions and concerns addressed. Contact pharmacist with any further questions or concerns prior to next appointment.          Sandra Patton, DotD  Clinical Ambulatory Care Pharmacist  Ph: [phone]    Continue  all meds under the continuation of care with the referring provider and clinical pharmacy team.    Clinical Pharmacist follow up: 9/4/25 or sooner as needed based on clinical intervention  Type of Encounter:  Telephone    Verbal consent to manage patient's drug therapy was obtained from the patient. They were informed they may decline to participate or withdraw from participation in pharmacy services at any time.       [1]   Current Outpatient Medications on File Prior to Visit   Medication Sig Dispense Refill    albuterol 2.5 mg /3 mL (0.083 %) nebulizer solution 1 UNIT DOSE AS NEEDED - RARELY      albuterol 90 mcg/actuation inhaler Inhale 2 puffs every 6 hours if needed.      Ca-D3-mag ox-zinc--rosario-bor (Calcium 600-D3 Plus, mag-zinc,) 600 mg calcium- 20 mcg-50 mg tablet Take 1 tablet by mouth in the morning and at bedtime.      cyanocobalamin, vitamin B-12, 1,000 mcg/mL kit Inject 1,000 mcg as directed every 28 (twenty-eight) days.      ferrous sulfate 220 mg (44 mg iron)/5 mL syrup Take 5 mL (44 mg of iron) by mouth once daily. 473 mL 1    Lactobac. rhamnosus GG-inulin 20 billion cell -200 mg capsule Take 1 tablet by mouth in the morning.      metFORMIN (Glucophage) 500 mg tablet Take 1 tablet (500 mg) by mouth once daily with breakfast. 100 tablet 3    pantoprazole (ProtoNix) 40 mg EC tablet Take 1 tablet (40 mg) by mouth 2 times a day. Do not crush, chew, or split. 60 tablet 5    sertraline (Zoloft) 100 mg tablet Take 1 tablet (100 mg) by mouth once daily. 90 tablet 1    silver sulfADIAZINE (Silvadene) 1 % cream Apply topically once daily. 50 g 1     Current Facility-Administered Medications on File Prior to Visit   Medication Dose Route Frequency Provider Last Rate Last Admin    [COMPLETED] iron sucrose (Venofer) injection 200 mg  200 mg intravenous Once Mary Jane Howard PA-C   200 mg at 08/07/25 [id number]

## 2025-08-12 ENCOUNTER — INFUSION (OUTPATIENT)
Dept: HEMATOLOGY/ONCOLOGY | Facility: CLINIC | Age: 40
End: 2025-08-12
Payer: COMMERCIAL

## 2025-08-12 VITALS
TEMPERATURE: 97.5 F | HEIGHT: 63 IN | HEART RATE: 84 BPM | BODY MASS INDEX: 46.09 KG/M2 | WEIGHT: 260.14 LBS | RESPIRATION RATE: 18 BRPM | SYSTOLIC BLOOD PRESSURE: 132 MMHG | DIASTOLIC BLOOD PRESSURE: 66 MMHG | OXYGEN SATURATION: 97 %

## 2025-08-12 DIAGNOSIS — B96.89 BV (BACTERIAL VAGINOSIS): ICD-10-CM

## 2025-08-12 DIAGNOSIS — N76.0 BV (BACTERIAL VAGINOSIS): ICD-10-CM

## 2025-08-12 DIAGNOSIS — D50.8 OTHER IRON DEFICIENCY ANEMIA: ICD-10-CM

## 2025-08-12 PROCEDURE — 96374 THER/PROPH/DIAG INJ IV PUSH: CPT | Mod: INF

## 2025-08-12 PROCEDURE — 2500000004 HC RX 250 GENERAL PHARMACY W/ HCPCS (ALT 636 FOR OP/ED): Mod: JZ | Performed by: PHYSICIAN ASSISTANT

## 2025-08-12 RX ORDER — ALBUTEROL SULFATE 0.83 MG/ML
3 SOLUTION RESPIRATORY (INHALATION) AS NEEDED
Status: CANCELLED | OUTPATIENT
Start: 2025-08-13

## 2025-08-12 RX ORDER — METRONIDAZOLE 500 MG/1
500 TABLET ORAL 2 TIMES DAILY
Qty: 14 TABLET | Refills: 0 | Status: SHIPPED | OUTPATIENT
Start: 2025-08-12 | End: 2025-08-19

## 2025-08-12 RX ORDER — DIPHENHYDRAMINE HYDROCHLORIDE 50 MG/ML
50 INJECTION, SOLUTION INTRAMUSCULAR; INTRAVENOUS AS NEEDED
Status: CANCELLED | OUTPATIENT
Start: 2025-08-13

## 2025-08-12 RX ORDER — EPINEPHRINE 0.3 MG/.3ML
0.3 INJECTION SUBCUTANEOUS EVERY 5 MIN PRN
Status: CANCELLED | OUTPATIENT
Start: 2025-08-13

## 2025-08-12 RX ORDER — FAMOTIDINE 10 MG/ML
20 INJECTION, SOLUTION INTRAVENOUS ONCE AS NEEDED
Status: CANCELLED | OUTPATIENT
Start: 2025-08-13

## 2025-08-12 RX ADMIN — IRON SUCROSE 200 MG: 20 INJECTION, SOLUTION INTRAVENOUS at 10:37

## 2025-08-12 ASSESSMENT — PAIN SCALES - GENERAL: PAINLEVEL_OUTOF10: 0-NO PAIN

## 2025-08-13 ENCOUNTER — APPOINTMENT (OUTPATIENT)
Facility: CLINIC | Age: 40
End: 2025-08-13
Payer: COMMERCIAL

## 2025-08-13 VITALS
DIASTOLIC BLOOD PRESSURE: 66 MMHG | SYSTOLIC BLOOD PRESSURE: 133 MMHG | HEIGHT: 63 IN | BODY MASS INDEX: 46.6 KG/M2 | WEIGHT: 263 LBS

## 2025-08-13 DIAGNOSIS — D50.0 IRON DEFICIENCY ANEMIA DUE TO CHRONIC BLOOD LOSS: ICD-10-CM

## 2025-08-13 DIAGNOSIS — N92.1 MENORRHAGIA WITH IRREGULAR CYCLE: Primary | ICD-10-CM

## 2025-08-13 PROCEDURE — 99214 OFFICE O/P EST MOD 30 MIN: CPT | Performed by: OBSTETRICS & GYNECOLOGY

## 2025-08-13 PROCEDURE — 0753T DGTZ GLS MCRSCP SLD LEVEL IV: CPT

## 2025-08-13 PROCEDURE — 58100 BIOPSY OF UTERUS LINING: CPT | Performed by: OBSTETRICS & GYNECOLOGY

## 2025-08-13 PROCEDURE — 88305 TISSUE EXAM BY PATHOLOGIST: CPT | Performed by: STUDENT IN AN ORGANIZED HEALTH CARE EDUCATION/TRAINING PROGRAM

## 2025-08-13 PROCEDURE — 88305 TISSUE EXAM BY PATHOLOGIST: CPT

## 2025-08-13 RX ORDER — NORETHINDRONE ACETATE AND ETHINYL ESTRADIOL 1.5-30(21)
1 KIT ORAL DAILY
Qty: 84 TABLET | Refills: 0 | Status: SHIPPED | OUTPATIENT
Start: 2025-08-13 | End: 2026-08-13

## 2025-08-13 ASSESSMENT — LIFESTYLE VARIABLES
HOW MANY STANDARD DRINKS CONTAINING ALCOHOL DO YOU HAVE ON A TYPICAL DAY: 1 OR 2
HOW OFTEN DO YOU HAVE SIX OR MORE DRINKS ON ONE OCCASION: NEVER
SKIP TO QUESTIONS 9-10: 1
HOW OFTEN DO YOU HAVE A DRINK CONTAINING ALCOHOL: 2-4 TIMES A MONTH
AUDIT-C TOTAL SCORE: 2

## 2025-08-14 ENCOUNTER — INFUSION (OUTPATIENT)
Dept: HEMATOLOGY/ONCOLOGY | Facility: CLINIC | Age: 40
End: 2025-08-14
Payer: COMMERCIAL

## 2025-08-14 VITALS
HEART RATE: 92 BPM | SYSTOLIC BLOOD PRESSURE: 137 MMHG | TEMPERATURE: 99 F | RESPIRATION RATE: 20 BRPM | DIASTOLIC BLOOD PRESSURE: 62 MMHG

## 2025-08-14 DIAGNOSIS — D50.8 OTHER IRON DEFICIENCY ANEMIA: ICD-10-CM

## 2025-08-14 PROCEDURE — 96374 THER/PROPH/DIAG INJ IV PUSH: CPT | Mod: INF

## 2025-08-14 PROCEDURE — 2500000004 HC RX 250 GENERAL PHARMACY W/ HCPCS (ALT 636 FOR OP/ED): Mod: JZ | Performed by: PHYSICIAN ASSISTANT

## 2025-08-14 RX ORDER — ALBUTEROL SULFATE 0.83 MG/ML
3 SOLUTION RESPIRATORY (INHALATION) AS NEEDED
OUTPATIENT
Start: 2025-08-15

## 2025-08-14 RX ORDER — FAMOTIDINE 10 MG/ML
20 INJECTION, SOLUTION INTRAVENOUS ONCE AS NEEDED
OUTPATIENT
Start: 2025-08-15

## 2025-08-14 RX ORDER — DIPHENHYDRAMINE HYDROCHLORIDE 50 MG/ML
50 INJECTION, SOLUTION INTRAMUSCULAR; INTRAVENOUS AS NEEDED
OUTPATIENT
Start: 2025-08-15

## 2025-08-14 RX ORDER — EPINEPHRINE 0.3 MG/.3ML
0.3 INJECTION SUBCUTANEOUS EVERY 5 MIN PRN
OUTPATIENT
Start: 2025-08-15

## 2025-08-14 RX ADMIN — IRON SUCROSE 200 MG: 20 INJECTION, SOLUTION INTRAVENOUS at 10:35

## 2025-08-14 ASSESSMENT — PAIN SCALES - GENERAL: PAINLEVEL_OUTOF10: 0-NO PAIN

## 2025-08-19 ENCOUNTER — INFUSION (OUTPATIENT)
Dept: HEMATOLOGY/ONCOLOGY | Facility: CLINIC | Age: 40
End: 2025-08-19
Payer: COMMERCIAL

## 2025-08-19 VITALS
HEART RATE: 81 BPM | TEMPERATURE: 96.8 F | RESPIRATION RATE: 20 BRPM | DIASTOLIC BLOOD PRESSURE: 65 MMHG | SYSTOLIC BLOOD PRESSURE: 127 MMHG

## 2025-08-19 DIAGNOSIS — D50.8 OTHER IRON DEFICIENCY ANEMIA: ICD-10-CM

## 2025-08-19 LAB
LABORATORY COMMENT REPORT: NORMAL
PATH REPORT.FINAL DX SPEC: NORMAL
PATH REPORT.GROSS SPEC: NORMAL
PATH REPORT.RELEVANT HX SPEC: NORMAL
PATH REPORT.TOTAL CANCER: NORMAL
RESIDENT REVIEW: NORMAL

## 2025-08-19 PROCEDURE — 96374 THER/PROPH/DIAG INJ IV PUSH: CPT | Mod: INF

## 2025-08-19 PROCEDURE — 2500000004 HC RX 250 GENERAL PHARMACY W/ HCPCS (ALT 636 FOR OP/ED): Mod: JZ | Performed by: PHYSICIAN ASSISTANT

## 2025-08-19 RX ORDER — DIPHENHYDRAMINE HYDROCHLORIDE 50 MG/ML
50 INJECTION, SOLUTION INTRAMUSCULAR; INTRAVENOUS AS NEEDED
Status: CANCELLED | OUTPATIENT
Start: 2025-08-20

## 2025-08-19 RX ORDER — ALBUTEROL SULFATE 0.83 MG/ML
3 SOLUTION RESPIRATORY (INHALATION) AS NEEDED
Status: CANCELLED | OUTPATIENT
Start: 2025-08-20

## 2025-08-19 RX ORDER — EPINEPHRINE 0.3 MG/.3ML
0.3 INJECTION SUBCUTANEOUS EVERY 5 MIN PRN
Status: CANCELLED | OUTPATIENT
Start: 2025-08-20

## 2025-08-19 RX ORDER — FAMOTIDINE 10 MG/ML
20 INJECTION, SOLUTION INTRAVENOUS ONCE AS NEEDED
Status: CANCELLED | OUTPATIENT
Start: 2025-08-20

## 2025-08-19 RX ADMIN — IRON SUCROSE 200 MG: 20 INJECTION, SOLUTION INTRAVENOUS at 10:40

## 2025-08-19 ASSESSMENT — PAIN SCALES - GENERAL: PAINLEVEL_OUTOF10: 0-NO PAIN

## 2025-08-21 ENCOUNTER — INFUSION (OUTPATIENT)
Dept: HEMATOLOGY/ONCOLOGY | Facility: CLINIC | Age: 40
End: 2025-08-21
Payer: COMMERCIAL

## 2025-08-21 VITALS
HEART RATE: 91 BPM | WEIGHT: 260.14 LBS | RESPIRATION RATE: 20 BRPM | SYSTOLIC BLOOD PRESSURE: 128 MMHG | HEIGHT: 63 IN | TEMPERATURE: 98.1 F | BODY MASS INDEX: 46.09 KG/M2 | DIASTOLIC BLOOD PRESSURE: 63 MMHG

## 2025-08-21 DIAGNOSIS — D50.8 OTHER IRON DEFICIENCY ANEMIA: ICD-10-CM

## 2025-08-21 PROCEDURE — 96374 THER/PROPH/DIAG INJ IV PUSH: CPT | Mod: INF

## 2025-08-21 PROCEDURE — 2500000004 HC RX 250 GENERAL PHARMACY W/ HCPCS (ALT 636 FOR OP/ED): Mod: JZ | Performed by: PHYSICIAN ASSISTANT

## 2025-08-21 RX ORDER — EPINEPHRINE 0.3 MG/.3ML
0.3 INJECTION SUBCUTANEOUS EVERY 5 MIN PRN
OUTPATIENT
Start: 2025-08-22

## 2025-08-21 RX ORDER — DIPHENHYDRAMINE HYDROCHLORIDE 50 MG/ML
50 INJECTION, SOLUTION INTRAMUSCULAR; INTRAVENOUS AS NEEDED
OUTPATIENT
Start: 2025-08-22

## 2025-08-21 RX ORDER — ALBUTEROL SULFATE 0.83 MG/ML
3 SOLUTION RESPIRATORY (INHALATION) AS NEEDED
OUTPATIENT
Start: 2025-08-22

## 2025-08-21 RX ORDER — FAMOTIDINE 10 MG/ML
20 INJECTION, SOLUTION INTRAVENOUS ONCE AS NEEDED
OUTPATIENT
Start: 2025-08-22

## 2025-08-21 RX ADMIN — IRON SUCROSE 200 MG: 20 INJECTION, SOLUTION INTRAVENOUS at 14:49

## 2025-08-21 ASSESSMENT — PAIN SCALES - GENERAL: PAINLEVEL_OUTOF10: 0-NO PAIN

## 2025-09-04 ENCOUNTER — LAB (OUTPATIENT)
Dept: LAB | Facility: HOSPITAL | Age: 40
End: 2025-09-04
Payer: COMMERCIAL

## 2025-09-04 ENCOUNTER — APPOINTMENT (OUTPATIENT)
Dept: PHARMACY | Facility: HOSPITAL | Age: 40
End: 2025-09-04
Payer: COMMERCIAL

## 2025-09-09 ENCOUNTER — APPOINTMENT (OUTPATIENT)
Dept: PRIMARY CARE | Facility: CLINIC | Age: 40
End: 2025-09-09
Payer: COMMERCIAL

## 2025-09-25 ENCOUNTER — APPOINTMENT (OUTPATIENT)
Dept: PHARMACY | Facility: HOSPITAL | Age: 40
End: 2025-09-25
Payer: COMMERCIAL

## 2025-10-28 ENCOUNTER — APPOINTMENT (OUTPATIENT)
Facility: CLINIC | Age: 40
End: 2025-10-28
Payer: COMMERCIAL

## 2026-01-27 ENCOUNTER — APPOINTMENT (OUTPATIENT)
Dept: PRIMARY CARE | Facility: CLINIC | Age: 41
End: 2026-01-27
Payer: COMMERCIAL

## (undated) DEVICE — DRESSING, MEPILEX BORDER, POST-OP AG, 2.5 X 3 IN

## (undated) DEVICE — TRAY, DRY PREP, PREMIUM

## (undated) DEVICE — STRAP, VELCRO, BODY, 4 X 60IN, NS

## (undated) DEVICE — DRAPE, SHEET, 17 X 23 IN

## (undated) DEVICE — GOWN, SURGICAL, ROYAL SILK, LG, STERILE

## (undated) DEVICE — ADHESIVE, SKIN, MASTISOL, 2/3 CC VIAL

## (undated) DEVICE — HOLSTER, ELECTROSURGERY ACCESSORY, STERILE

## (undated) DEVICE — SUTURE, ETHILON, 4-0, BLK, MONO, PS-2 18

## (undated) DEVICE — STRAP, ARM BOARD, 32 X 1.5

## (undated) DEVICE — GLOVE, SURGICAL, PROTEXIS PI BLUE W/NEUTHERA, 6.5, PF, LF

## (undated) DEVICE — TOWELS 4-PK

## (undated) DEVICE — SYRINGE, 60 CC, IRRIGATION, BULB, CONTRO-BULB, PAPER POUCH

## (undated) DEVICE — SOLUTION, SCRUB EXIDINE, 4% CHG, 8 OZ

## (undated) DEVICE — Device

## (undated) DEVICE — MANIFOLD, 4 PORT NEPTUNE STANDARD

## (undated) DEVICE — GLOVE, SURGICAL, PROTEXIS PI , 6.5, PF, LF

## (undated) DEVICE — GLOVE, SURGICAL, PROTEXIS PI , 7.0, PF, LF